# Patient Record
Sex: FEMALE | Race: WHITE
[De-identification: names, ages, dates, MRNs, and addresses within clinical notes are randomized per-mention and may not be internally consistent; named-entity substitution may affect disease eponyms.]

---

## 2016-05-27 VITALS
SYSTOLIC BLOOD PRESSURE: 132 MMHG | SYSTOLIC BLOOD PRESSURE: 132 MMHG | DIASTOLIC BLOOD PRESSURE: 60 MMHG | DIASTOLIC BLOOD PRESSURE: 60 MMHG

## 2017-07-05 ENCOUNTER — HOSPITAL ENCOUNTER (OUTPATIENT)
Dept: HOSPITAL 61 - US | Age: 77
Discharge: HOME | End: 2017-07-05
Attending: NURSE PRACTITIONER
Payer: MEDICARE

## 2017-07-05 DIAGNOSIS — R10.9: Primary | ICD-10-CM

## 2017-07-05 PROCEDURE — 76770 US EXAM ABDO BACK WALL COMP: CPT

## 2017-07-05 NOTE — RAD
Indication right flank pain.



Grayscale imaging targeted to the kidneys was performed. No similar imaging is

available.



The urinary bladder appeared grossly normal. There was no significant post

void residual. The mid and distal abdominal aorta appeared normal. The more

proximal abdominal aorta was obscured. The visualized inferior vena cava

appeared unremarkable. The right kidney measures 8.6 x 3.6 x 4.1 cm. Other

than being somewhat small the kidney appears unremarkable. No hydronephrosis

or mass is seen. The left kidney measures 10.6 x 6.3 x 4.4 cm. No

hydronephrosis or mass is seen.



IMPRESSION: Somewhat small right kidney. No mass or hydronephrosis is seen

associated with either kidney

## 2018-07-26 ENCOUNTER — HOSPITAL ENCOUNTER (OUTPATIENT)
Dept: HOSPITAL 61 - KCIC MRI | Age: 78
Discharge: HOME | End: 2018-07-26
Attending: FAMILY MEDICINE
Payer: MEDICARE

## 2018-07-26 DIAGNOSIS — R26.2: ICD-10-CM

## 2018-07-26 DIAGNOSIS — M47.896: Primary | ICD-10-CM

## 2018-07-26 DIAGNOSIS — M48.061: ICD-10-CM

## 2018-07-26 PROCEDURE — 72148 MRI LUMBAR SPINE W/O DYE: CPT

## 2018-08-09 ENCOUNTER — HOSPITAL ENCOUNTER (OUTPATIENT)
Dept: HOSPITAL 61 - PNCL | Age: 78
Discharge: HOME | End: 2018-08-09
Attending: ANESTHESIOLOGY
Payer: MEDICARE

## 2018-08-09 DIAGNOSIS — M79.605: Primary | ICD-10-CM

## 2018-08-09 DIAGNOSIS — M79.604: ICD-10-CM

## 2018-08-09 DIAGNOSIS — Z85.3: ICD-10-CM

## 2018-08-09 DIAGNOSIS — M54.5: ICD-10-CM

## 2018-08-09 DIAGNOSIS — M19.90: ICD-10-CM

## 2018-08-09 DIAGNOSIS — Z90.10: ICD-10-CM

## 2018-08-09 DIAGNOSIS — E11.9: ICD-10-CM

## 2018-08-09 DIAGNOSIS — I10: ICD-10-CM

## 2018-08-14 ENCOUNTER — HOSPITAL ENCOUNTER (OUTPATIENT)
Dept: HOSPITAL 61 - RAD | Age: 78
Discharge: HOME | End: 2018-08-14
Attending: NEUROLOGICAL SURGERY
Payer: MEDICARE

## 2018-08-14 DIAGNOSIS — M40.46: ICD-10-CM

## 2018-08-14 DIAGNOSIS — M43.16: ICD-10-CM

## 2018-08-14 DIAGNOSIS — I25.10: ICD-10-CM

## 2018-08-14 DIAGNOSIS — E11.9: ICD-10-CM

## 2018-08-14 DIAGNOSIS — M47.896: Primary | ICD-10-CM

## 2018-08-14 DIAGNOSIS — M25.78: ICD-10-CM

## 2018-08-14 DIAGNOSIS — I10: ICD-10-CM

## 2018-08-14 PROCEDURE — 72110 X-RAY EXAM L-2 SPINE 4/>VWS: CPT

## 2018-08-14 NOTE — RAD
EXAM: Supine AP and lateral views of the Lumbar spine with upright flexion

and extension views

 

DATE: 8/14/2018 11:38 AM

 

CLINICAL HISTORY: Spondylolisthesis, BACK PAIN

 

COMPARISON: MRI 7/26/2019

 

FINDINGS: 

There are 5 nonrib-bearing lumbar type vertebral bodies. Small ribs are 

seen at T12.

 

Mild L5-S1 intervertebral disc height loss.. Vertebral body heights are 

preserved.

 

Multilevel endplate osteophytes are seen. Moderate facet degenerative 

changes are seen at L3-4 and below. 

 

Mild straightening of the normal lumbar lordosis. There is approximately 

1.1 cm anterolisthesis of L4 on L5 on the flexion images and 8 mm 

anterolisthesis of L4 on L5 on the extension images. Normal bone density.

 

Atherosclerotic vascular calcifications are seen.

 

IMPRESSION:

1.  Anterolisthesis of L4 on L5, measuring 1.1 cm on the flexion images 

and 8 mm on the extension images. 

2.  Multilevel degenerative changes of the lumbar spine as above

 

Electronically signed by: Lee Garzon MD (8/14/2018 5:34 PM) [facility]

## 2019-01-04 ENCOUNTER — HOSPITAL ENCOUNTER (EMERGENCY)
Dept: HOSPITAL 61 - ER | Age: 79
LOS: 1 days | Discharge: HOME | End: 2019-01-05
Payer: MEDICARE

## 2019-01-04 VITALS — WEIGHT: 214 LBS | BODY MASS INDEX: 42.01 KG/M2 | HEIGHT: 60 IN

## 2019-01-04 DIAGNOSIS — M25.552: Primary | ICD-10-CM

## 2019-01-04 DIAGNOSIS — I10: ICD-10-CM

## 2019-01-04 DIAGNOSIS — Z95.5: ICD-10-CM

## 2019-01-04 DIAGNOSIS — Z90.89: ICD-10-CM

## 2019-01-04 DIAGNOSIS — E78.00: ICD-10-CM

## 2019-01-04 DIAGNOSIS — E11.9: ICD-10-CM

## 2019-01-04 DIAGNOSIS — Z90.710: ICD-10-CM

## 2019-01-04 DIAGNOSIS — Z88.8: ICD-10-CM

## 2019-01-04 PROCEDURE — 99283 EMERGENCY DEPT VISIT LOW MDM: CPT

## 2019-01-04 PROCEDURE — 73502 X-RAY EXAM HIP UNI 2-3 VIEWS: CPT

## 2019-01-05 VITALS
DIASTOLIC BLOOD PRESSURE: 65 MMHG | DIASTOLIC BLOOD PRESSURE: 65 MMHG | SYSTOLIC BLOOD PRESSURE: 152 MMHG | DIASTOLIC BLOOD PRESSURE: 65 MMHG | SYSTOLIC BLOOD PRESSURE: 152 MMHG | SYSTOLIC BLOOD PRESSURE: 152 MMHG

## 2019-01-05 NOTE — RAD
HIP LEFT 2V WITH PELVIS

 

History: LEFT HIP PAIN, SINCE 1/1/2019, NKI.

 

Comparison: None are available

 

Subchondral sclerosis at the pubic symphysis, greater on the right, with 

mild degenerative changes at the joint. Joint spaces at the right and left

hip appear intact. There is no evidence of an acute fracture or aggressive

bone destruction. Mild to moderate retained stool in the visualized colon.

 

IMPRESSION:

1. Changes at the pubic symphysis suggest osteitis pubis or degenerative 

change.

2. No acute radiographic findings.

 

Electronically signed by: Cesar Covarrubias MD (1/5/2019 8:35 AM) [facility]

## 2019-01-05 NOTE — PHYS DOC
Past Medical History


Past Medical History:  Arthritis, Cancer, Diabetes-Type II, High Cholesterol, 

Hypertension, Other


Additional Past Medical Histor:  SPINAL STENOSIS, LYMPHOMER, RESTLESS LEG 

SYNDROME. BREAST CA


Past Surgical History:  Angioplasty, Appendectomy, Hysterectomy, Tonsillectomy, 

Other


Additional Past Surgical Histo:  R MASECTOMY, LUMPECTOMY, FIBROIDECTOMY


Alcohol Use:  None


Drug Use:  None





Adult General


Chief Complaint


Chief Complaint:  HIP PAIN





HPI


HPI





Patient is a 78  year old female who presents with left hip pain. This is been 

present for the past 3-4 days. There is been no new trauma. Patient did receive 

cardiac catheter and stent on January 1 with a right femoral approach. Patient 

reports that the right side is doing fine. She is concerned that by increased 

weightbearing on the left due to protecting the right side, that this may have 

caused the issue. Patient is able to walk. Denies any numbness or tingling. 

Reports that she does get some relief with her hydrocodone. Patient was seen by 

her primary care physician as well as in urgent care tonight and they "did 

nothing." Weightbearing does seem to make the discomfort worse. Pain is 

moderate in intensity.[]





Review of Systems


Review of Systems





Constitutional: Denies fever or chills []


Eyes: Denies change in visual acuity, redness, or eye pain []


HENT: Denies nasal congestion or sore throat []


Respiratory: Denies cough or shortness of breath []


Cardiovascular: No chest pain or palpitations[]


GI: Denies abdominal pain, nausea, vomiting, bloody stools or diarrhea []


: Denies dysuria or hematuria []


Musculoskeletal: See history of present illness[]


Integument: Denies rash or skin lesions []


Neurologic: Denies headache, focal weakness or sensory changes []


Endocrine: Denies polyuria or polydipsia []





All other systems were reviewed and found to be within normal limits, except as 

documented in this note.





Allergies


Allergies





Allergies








Coded Allergies Type Severity Reaction Last Updated Verified


 


  ACE Inhibitors Allergy Intermediate  12/23/18 Yes


 


  amlodipine Allergy Intermediate  12/23/18 Yes


 


  rosuvastatin Allergy Intermediate  12/23/18 Yes











Physical Exam


Physical Exam





Constitutional: Well developed, well nourished, no acute distress, non-toxic 

appearance. []


HENT: Normocephalic, atraumatic, bilateral external ears normal, oropharynx 

moist, no oral exudates, nose normal. []


Eyes: PERRLA, EOMI, conjunctiva normal, no discharge. [] 


Neck: Normal range of motion, no tenderness, supple, no stridor. [] 


Cardiovascular:Heart rate regular rhythm, no murmur []


Lungs & Thorax:  Bilateral breath sounds clear to auscultation []


Abdomen: Bowel sounds normal, soft, no tenderness, no masses, no pulsatile 

masses. [] 


Skin: Warm, dry, no erythema, no rash. [] 


Back: No tenderness, no CVA tenderness. [] 


Extremities: Diffuse tenderness around the LEFT hip. Symmetric Active range of 

motion. No knee tenderness. Patient is distal neurovascularly intact. Pelvis is 

stable in 3 plains. no cyanosis, no clubbing, ROM intact, no edema. [] 


Neurologic: Alert and oriented X 3, normal motor function, normal sensory 

function, no focal deficits noted. []


Psychologic: Affect normal, judgement normal, mood normal. []





Current Patient Data


Vital Signs





 Vital Signs








  Date Time  Temp Pulse Resp B/P (MAP) Pulse Ox O2 Delivery O2 Flow Rate FiO2


 


1/5/19 00:09 99.4 103 24 142/74 (96) 93 Room Air  





 99.4       











EKG


EKG


[]





Radiology/Procedures


Radiology/Procedures


Left hip and pelvis x-ray shows no acute fracture or dislocation[]





Course & Med Decision Making


Course & Med Decision Making


Pertinent Labs and Imaging studies reviewed. (See chart for details)





ED course: Patient arrived, was placed in bed, tolerated exam well. Patient was 

transported to and from x-ray without any complications. After the return of 

the imaging findings, these were discussed with patient and family who voiced 

understanding. All questions were answered.





Medical decision making: There is no evidence of fracture dislocation. No 

evidence of a DVT. Given that patient is currently on a blood thinning 

medication cannot add NSAIDs to her regimen. We will provide a short course of 

short-term narcotic pain medicine for this acute issue.[]





Dragon Disclaimer


Dragon Disclaimer


This electronic medical record was generated, in whole or in part, using a 

voice recognition dictation system.





Departure


Departure


Impression:  


 Primary Impression:  


 Left hip pain


Disposition:  01 HOME, SELF-CARE


Condition:  GOOD


Referrals:  


BRADFORD SAN MD (PCP)


Follow-up in 2 days


Patient Instructions:  Hip Pain





Additional Instructions:  


Follow-up with your regular doctor in 2 days. Apply warm compresses for 15 

minutes at a time, at least 4 times a day. Return to the ER if worsening pain 

or any other concerns.


Scripts


Morphine Sulfate (MORPHINE SULFATE) 15 Mg Tablet


1 TAB PO BID, #20 TAB


   Prov: ERICA AVALOS DO         1/5/19











ERICA AVALOS DO Jan 5, 2019 00:13

## 2019-08-06 ENCOUNTER — HOSPITAL ENCOUNTER (OUTPATIENT)
Dept: HOSPITAL 61 - ECHO | Age: 79
Discharge: HOME | End: 2019-08-06
Attending: INTERNAL MEDICINE
Payer: MEDICARE

## 2019-08-06 DIAGNOSIS — I08.8: Primary | ICD-10-CM

## 2019-08-06 DIAGNOSIS — I25.10: ICD-10-CM

## 2019-08-06 PROCEDURE — 93306 TTE W/DOPPLER COMPLETE: CPT

## 2019-08-06 NOTE — CARD
MR#: [id number]

Account#: [id number]

Accession#: [id number]

Date of Study: 08/06/2019

Ordering Physician: SILVER BETANCUR, 

Referring Physician: SILVER BETANCUR, 

Tech: Agnes [address] ALLEY





--------------- APPROVED REPORT --------------





EXAM: Two-dimensional and M-mode echocardiogram with Doppler and color Doppler.



Other Information 

Quality : Good



INDICATION

Cardiac Disease: CAD 



2D DIMENSIONS 

RVDd2.5 (2.9-3.5cm)Left Atrium(2D)3.7 (1.6-4.0cm)

IVSd1.3 (0.7-1.1cm)Aortic Root(2D)2.3 (2.0-3.7cm)

LVDd3.4 (3.9-5.9cm)LVOT Diameter2.1 (1.8-2.4cm)

PWd1.0 (0.7-1.1cm)LVDs2.5 (2.5-4.0cm)

FS (%) 27.7 %SV26.4 ml

LVEF(%)54.8 (>50%)



Aortic Valve

AoV Peak Aureliano.136.8cm/sAoV VTI29.3cm

AO Peak GR.7.5mmHgLVOT Peak Aureliano.85.7cm/s

AO Mean GR.4mmHgAVA (VMAX)2.23cm2

DEAN   (VTI)2.70cm2



Mitral Valve

MV E Ovpuyufp42.5cm/sMV DECEL [id number]

MV A Kddgfqvk166.4cm/sE/A  Ratio0.6



Tricuspid Valve

TR P. Nruqvcya086gv/sRAP DLNPSVEM6sxXd

TR Peak Gr.45oiUbGQOT94zeJc



Pulmonary Vein

S1 Rogfreij53.0cm/sD2 Tpcssfbt14.4cm/s



 LEFT VENTRICLE 

The left ventricle is normal size. There is mild asymmetric septal hypertrophy. The left ventricular 
systolic function is normal and the ejection fraction is within normal range. The Ejection Fraction i
s 55-60%. There is normal LV segmental wall motion. Transmitral Doppler flow pattern is Grade I-abnor
mal relaxation pattern.



 RIGHT VENTRICLE 

The right ventricle is normal size. The right ventricular systolic function is normal.



 ATRIA 

The left atrium size is normal. The right atrium size is normal. The interatrial septum is intact wit
h no evidence for an atrial septal defect or patent foramen ovale as noted on 2-D or Doppler imaging.




 AORTIC VALVE 

The aortic valve is calcified but opens well. Doppler and Color Flow revealed no significant aortic r
egurgitation. There is no significant aortic valvular stenosis.



 MITRAL VALVE 

The mitral valve is calcified but opens well. Mitral annular calcification is mild. There is no evide
nce of mitral valve prolapse. There is no mitral valve stenosis. Doppler and Color Flow revealed no m
itral valve regurgitation noted.



 TRICUSPID VALVE 

The tricuspid valve is normal in structure and function. Doppler and Color Flow revealed physiologica
l tricuspid regurgitation. The PA pressure was estimated at 22 mmHg. There is no tricuspid valve sten
osis.



 PULMONIC VALVE 

The pulmonic valve is not well visualized. Doppler and Color Flow revealed mild pulmonic valvular reg
urgitation. There is no pulmonic valvular stenosis.



 GREAT VESSELS 

The aortic root is normal in size. The ascending aorta is normal in size. The IVC is normal in size a
nd collapses >50% with inspiration.



 PERICARDIAL EFFUSION 

There is no evidence of significant pericardial effusion.



Critical Notification

Critical Value: No



<Conclusion>

The left ventricular systolic function is normal and the ejection fraction is within normal range. Th
e Ejection Fraction is 55-60%.

There is normal LV segmental wall motion.



Signed by : Silver Betancur, 

Electronically Approved : 08/06/2019 10:22:36

## 2020-06-19 ENCOUNTER — HOSPITAL ENCOUNTER (OUTPATIENT)
Dept: HOSPITAL 61 - ECHO | Age: 80
Discharge: HOME | End: 2020-06-19
Attending: INTERNAL MEDICINE
Payer: MEDICARE

## 2020-06-19 DIAGNOSIS — I37.1: Primary | ICD-10-CM

## 2020-06-19 DIAGNOSIS — I25.10: ICD-10-CM

## 2020-06-19 PROCEDURE — 93306 TTE W/DOPPLER COMPLETE: CPT

## 2020-06-19 NOTE — CARD
MR#: [id number]

Account#: [id number]

Accession#: [id number]

Date of Study: 06/19/2020

Ordering Physician: SILVER REEVES, 

Referring Physician: SILVER REEVES, 

Tech: Kylee Mercado





--------------- APPROVED REPORT --------------





EXAM: Two-dimensional and M-mode echocardiogram with Doppler and color Doppler.



Other Information 

Quality : AverageHR: 75bpm

Technically limited study due to  body habitus.



INDICATION

Cardiac Disease: CAD 



RISK FACTORS

Hypertension 

Hyperlipidemia

Diabetes



2D DIMENSIONS 

RVDd2.8 (2.9-3.5cm)Left Atrium(2D)3.7 (1.6-4.0cm)

IVSd1.1 (0.7-1.1cm)LVDd4.0 (3.9-5.9cm)

LVOT Diameter2.0 (1.8-2.4cm)PWd0.9 (0.7-1.1cm)

LVDs2.7 (2.5-4.0cm)FS (%) 31.4 %

SV41.4 mlLVEF(%)59.9 (>50%)



Aortic Valve

AoV Peak Aureliano.127.1cm/sAoV VTI28.9cm

AO Peak GR.6.5mmHgLVOT Peak Aureliano.84.6cm/s

LVOT  VTI 20.53cmAO Mean GR.4mmHg

DEAN (VMAX)1.90kh7IRA   (VTI)2.28cm2



Mitral Valve

MV E Fcfjttxs19.5cm/sMV DECEL [id number]

MV A Ggamfkut80.4cm/sMV E Mean Gr.2mmHg

MV MBZ94zmU/A  Ratio0.7

MVA (PHT)3.39cm2



TDI

E/Lateral E'7.6E/Medial E'8.3



Pulmonary Valve

PV Peak Hufakzwi89.0cm/sPV Peak Grad.4mmHg



Tricuspid Valve

TR P. Nfwimwcj208zo/sRAP VCCNCJTC3bjYk

TR Peak Gr.52mlTjOMPX67zkVn



Pulmonary Vein

S1 Llppgulc85.8cm/sD2 Egullddo63.6cm/s

PVa aaurptts894ldoy



 LEFT VENTRICLE 

The left ventricle is normal size. There is normal left ventricular wall thickness. The left ventricu
lar systolic function is normal and the ejection fraction is within normal range. The Ejection Fracti
on is 55%. There is normal LV segmental wall motion. Transmitral Doppler flow pattern is Grade I-abno
rmal relaxation pattern.



 RIGHT VENTRICLE 

The right ventricle is normal size. There is normal right ventricular wall thickness. The right ventr
icular systolic function is normal.



 ATRIA 

The left atrium size is normal. The right atrium size is normal. The interatrial septum is intact wit
h no evidence for an atrial septal defect or patent foramen ovale as noted on 2-D or Doppler imaging.




 AORTIC VALVE 

The aortic valve is normal in structure and function. Cannot rule out prior bioprosthetic aortic valv
e. Doppler and Color Flow revealed no significant aortic regurgitation. There is no significant aorti
c valvular stenosis.



 MITRAL VALVE 

The mitral valve is normal in structure and function. There is no evidence of mitral valve prolapse. 
There is no mitral valve stenosis. Doppler and Color-flow revealed trace mitral regurgitation.



 TRICUSPID VALVE 

The tricuspid valve is normal in structure and function. Doppler and Color Flow revealed trace tricus
pid regurgitation with an estimated PAP of 39 mmHg. There is no tricuspid valve stenosis.



 PULMONIC VALVE 

The pulmonary valve is normal in structure and function. Doppler and Color Flow revealed trace to mil
d pulmonic valvular regurgitation. There is no pulmonic valvular stenosis.



 GREAT VESSELS 

The aortic root is normal in size. The ascending aorta is normal in size. The IVC is normal in size a
nd collapses >50% with inspiration.



 PERICARDIAL EFFUSION 

There is no evidence of significant pericardial effusion.



Critical Notification

Critical Value: No



<Conclusion>

The left ventricular systolic function is normal and the ejection fraction is within normal range. Th
e Ejection Fraction is 55%.

There is normal LV segmental wall motion.

The aortic valve is normal in structure and function. Cannot rule out prior bioprosthetic aortic valv
e.

Doppler and Color Flow revealed trace tricuspid regurgitation with an estimated PAP of 39 mmHg.



Signed by : Silver Reeves, 

Electronically Approved : 06/19/2020 11:15:25

## 2021-01-01 ENCOUNTER — HOSPITAL ENCOUNTER (INPATIENT)
Dept: HOSPITAL 61 - ER | Age: 81
LOS: 3 days | Discharge: HOME | DRG: 291 | End: 2021-01-04
Attending: INTERNAL MEDICINE | Admitting: INTERNAL MEDICINE
Payer: COMMERCIAL

## 2021-01-01 VITALS — WEIGHT: 207.45 LBS | HEIGHT: 60 IN | BODY MASS INDEX: 40.73 KG/M2

## 2021-01-01 DIAGNOSIS — E03.9: ICD-10-CM

## 2021-01-01 DIAGNOSIS — I50.33: ICD-10-CM

## 2021-01-01 DIAGNOSIS — Z90.710: ICD-10-CM

## 2021-01-01 DIAGNOSIS — Z20.822: ICD-10-CM

## 2021-01-01 DIAGNOSIS — Z90.49: ICD-10-CM

## 2021-01-01 DIAGNOSIS — M19.90: ICD-10-CM

## 2021-01-01 DIAGNOSIS — Z85.3: ICD-10-CM

## 2021-01-01 DIAGNOSIS — I13.0: Primary | ICD-10-CM

## 2021-01-01 DIAGNOSIS — R06.03: ICD-10-CM

## 2021-01-01 DIAGNOSIS — E87.2: ICD-10-CM

## 2021-01-01 DIAGNOSIS — E78.5: ICD-10-CM

## 2021-01-01 DIAGNOSIS — E11.22: ICD-10-CM

## 2021-01-01 DIAGNOSIS — I25.10: ICD-10-CM

## 2021-01-01 DIAGNOSIS — Z98.61: ICD-10-CM

## 2021-01-01 DIAGNOSIS — E78.00: ICD-10-CM

## 2021-01-01 DIAGNOSIS — Z82.49: ICD-10-CM

## 2021-01-01 DIAGNOSIS — Z90.11: ICD-10-CM

## 2021-01-01 DIAGNOSIS — F32.9: ICD-10-CM

## 2021-01-01 DIAGNOSIS — Z88.8: ICD-10-CM

## 2021-01-01 DIAGNOSIS — E66.9: ICD-10-CM

## 2021-01-01 DIAGNOSIS — N18.9: ICD-10-CM

## 2021-01-01 DIAGNOSIS — G25.81: ICD-10-CM

## 2021-01-01 PROCEDURE — 83735 ASSAY OF MAGNESIUM: CPT

## 2021-01-01 PROCEDURE — 80048 BASIC METABOLIC PNL TOTAL CA: CPT

## 2021-01-01 PROCEDURE — 84443 ASSAY THYROID STIM HORMONE: CPT

## 2021-01-01 PROCEDURE — 99285 EMERGENCY DEPT VISIT HI MDM: CPT

## 2021-01-01 PROCEDURE — 82553 CREATINE MB FRACTION: CPT

## 2021-01-01 PROCEDURE — 82962 GLUCOSE BLOOD TEST: CPT

## 2021-01-01 PROCEDURE — 96374 THER/PROPH/DIAG INJ IV PUSH: CPT

## 2021-01-01 PROCEDURE — 84484 ASSAY OF TROPONIN QUANT: CPT

## 2021-01-01 PROCEDURE — 83605 ASSAY OF LACTIC ACID: CPT

## 2021-01-01 PROCEDURE — G0378 HOSPITAL OBSERVATION PER HR: HCPCS

## 2021-01-01 PROCEDURE — 93306 TTE W/DOPPLER COMPLETE: CPT

## 2021-01-01 PROCEDURE — 83880 ASSAY OF NATRIURETIC PEPTIDE: CPT

## 2021-01-01 PROCEDURE — 36415 COLL VENOUS BLD VENIPUNCTURE: CPT

## 2021-01-01 PROCEDURE — 85379 FIBRIN DEGRADATION QUANT: CPT

## 2021-01-01 PROCEDURE — 71275 CT ANGIOGRAPHY CHEST: CPT

## 2021-01-01 PROCEDURE — 84439 ASSAY OF FREE THYROXINE: CPT

## 2021-01-01 PROCEDURE — 87086 URINE CULTURE/COLONY COUNT: CPT

## 2021-01-01 PROCEDURE — 80053 COMPREHEN METABOLIC PANEL: CPT

## 2021-01-01 PROCEDURE — 96361 HYDRATE IV INFUSION ADD-ON: CPT

## 2021-01-01 PROCEDURE — 84481 FREE ASSAY (FT-3): CPT

## 2021-01-01 PROCEDURE — 84100 ASSAY OF PHOSPHORUS: CPT

## 2021-01-01 PROCEDURE — 85025 COMPLETE CBC W/AUTO DIFF WBC: CPT

## 2021-01-01 PROCEDURE — 81001 URINALYSIS AUTO W/SCOPE: CPT

## 2021-01-01 PROCEDURE — U0003 INFECTIOUS AGENT DETECTION BY NUCLEIC ACID (DNA OR RNA); SEVERE ACUTE RESPIRATORY SYNDROME CORONAVIRUS 2 (SARS-COV-2) (CORONAVIRUS DISEASE [COVID-19]), AMPLIFIED PROBE TECHNIQUE, MAKING USE OF HIGH THROUGHPUT TECHNOLOGIES AS DESCRIBED BY CMS-2020-01-R: HCPCS

## 2021-01-01 PROCEDURE — 93005 ELECTROCARDIOGRAM TRACING: CPT

## 2021-01-02 VITALS — SYSTOLIC BLOOD PRESSURE: 166 MMHG | DIASTOLIC BLOOD PRESSURE: 77 MMHG

## 2021-01-02 VITALS — SYSTOLIC BLOOD PRESSURE: 116 MMHG | DIASTOLIC BLOOD PRESSURE: 65 MMHG

## 2021-01-02 VITALS — SYSTOLIC BLOOD PRESSURE: 136 MMHG | DIASTOLIC BLOOD PRESSURE: 90 MMHG

## 2021-01-02 VITALS — SYSTOLIC BLOOD PRESSURE: 147 MMHG | DIASTOLIC BLOOD PRESSURE: 93 MMHG

## 2021-01-02 VITALS — SYSTOLIC BLOOD PRESSURE: 176 MMHG | DIASTOLIC BLOOD PRESSURE: 84 MMHG

## 2021-01-02 VITALS — SYSTOLIC BLOOD PRESSURE: 169 MMHG | DIASTOLIC BLOOD PRESSURE: 93 MMHG

## 2021-01-02 VITALS — SYSTOLIC BLOOD PRESSURE: 167 MMHG | DIASTOLIC BLOOD PRESSURE: 76 MMHG

## 2021-01-02 LAB
ALBUMIN SERPL-MCNC: 3.7 G/DL (ref 3.4–5)
ALBUMIN/GLOB SERPL: 1 {RATIO} (ref 1–1.7)
ALP SERPL-CCNC: 94 U/L (ref 46–116)
ALT SERPL-CCNC: 30 U/L (ref 14–59)
ANION GAP SERPL CALC-SCNC: 12 MMOL/L (ref 6–14)
APTT PPP: YELLOW S
AST SERPL-CCNC: 23 U/L (ref 15–37)
BACTERIA #/AREA URNS HPF: (no result) /HPF
BASOPHILS # BLD AUTO: 0.1 X10^3/UL (ref 0–0.2)
BASOPHILS NFR BLD: 1 % (ref 0–3)
BILIRUB SERPL-MCNC: 0.4 MG/DL (ref 0.2–1)
BILIRUB UR QL STRIP: NEGATIVE
BUN SERPL-MCNC: 18 MG/DL (ref 7–20)
BUN/CREAT SERPL: 15 (ref 6–20)
CALCIUM SERPL-MCNC: 9.5 MG/DL (ref 8.5–10.1)
CHLORIDE SERPL-SCNC: 102 MMOL/L (ref 98–107)
CK SERPL-CCNC: 90 U/L (ref 26–192)
CO2 SERPL-SCNC: 26 MMOL/L (ref 21–32)
CREAT SERPL-MCNC: 1.2 MG/DL (ref 0.6–1)
EOSINOPHIL NFR BLD: 0.3 X10^3/UL (ref 0–0.7)
EOSINOPHIL NFR BLD: 3 % (ref 0–3)
ERYTHROCYTE [DISTWIDTH] IN BLOOD BY AUTOMATED COUNT: 13.3 % (ref 11.5–14.5)
FIBRINOGEN PPP-MCNC: CLEAR MG/DL
GFR SERPLBLD BASED ON 1.73 SQ M-ARVRAT: 43.3 ML/MIN
GLUCOSE SERPL-MCNC: 152 MG/DL (ref 70–99)
HCT VFR BLD CALC: 40.2 % (ref 36–47)
HGB BLD-MCNC: 13.5 G/DL (ref 12–15.5)
LYMPHOCYTES # BLD: 2.1 X10^3/UL (ref 1–4.8)
LYMPHOCYTES NFR BLD AUTO: 26 % (ref 24–48)
MAGNESIUM SERPL-MCNC: 1.9 MG/DL (ref 1.8–2.4)
MCH RBC QN AUTO: 33 PG (ref 25–35)
MCHC RBC AUTO-ENTMCNC: 34 G/DL (ref 31–37)
MCV RBC AUTO: 97 FL (ref 79–100)
MONO #: 0.7 X10^3/UL (ref 0–1.1)
MONOCYTES NFR BLD: 9 % (ref 0–9)
NEUT #: 5 X10^3/UL (ref 1.8–7.7)
NEUTROPHILS NFR BLD AUTO: 61 % (ref 31–73)
NITRITE UR QL STRIP: NEGATIVE
PH UR STRIP: 6.5 [PH]
PLATELET # BLD AUTO: 245 X10^3/UL (ref 140–400)
POTASSIUM SERPL-SCNC: 4.2 MMOL/L (ref 3.5–5.1)
PROT SERPL-MCNC: 7.4 G/DL (ref 6.4–8.2)
PROT UR STRIP-MCNC: NEGATIVE MG/DL
RBC # BLD AUTO: 4.16 X10^6/UL (ref 3.5–5.4)
RBC #/AREA URNS HPF: (no result) /HPF (ref 0–2)
SODIUM SERPL-SCNC: 140 MMOL/L (ref 136–145)
T4 FREE SERPL-MCNC: 0.86 NG/DL (ref 0.76–1.46)
THYROID STIM HORMONE (TSH): 5.75 UIU/ML (ref 0.36–3.74)
UROBILINOGEN UR-MCNC: 0.2 MG/DL
WBC # BLD AUTO: 8.2 X10^3/UL (ref 4–11)
WBC #/AREA URNS HPF: (no result) /HPF (ref 0–4)

## 2021-01-02 RX ADMIN — METOPROLOL SUCCINATE SCH MG: 25 TABLET, EXTENDED RELEASE ORAL at 13:11

## 2021-01-02 RX ADMIN — CALCIUM CARBONATE-VITAMIN D TAB 500 MG-200 UNIT SCH TAB: 500-200 TAB at 17:17

## 2021-01-02 RX ADMIN — PRAMIPEXOLE DIHYDROCHLORIDE SCH MG: 0.25 TABLET ORAL at 21:49

## 2021-01-02 RX ADMIN — EZETIMIBE SCH MG: 10 TABLET ORAL at 13:10

## 2021-01-02 RX ADMIN — LEVOTHYROXINE SODIUM SCH MCG: 88 TABLET ORAL at 13:12

## 2021-01-02 RX ADMIN — INSULIN LISPRO SCH UNITS: 100 INJECTION, SOLUTION INTRAVENOUS; SUBCUTANEOUS at 12:00

## 2021-01-02 RX ADMIN — VENLAFAXINE SCH MG: 50 TABLET ORAL at 13:13

## 2021-01-02 RX ADMIN — TRAZODONE HYDROCHLORIDE SCH MG: 50 TABLET ORAL at 21:42

## 2021-01-02 RX ADMIN — INSULIN LISPRO SCH UNITS: 100 INJECTION, SOLUTION INTRAVENOUS; SUBCUTANEOUS at 17:19

## 2021-01-02 RX ADMIN — Medication SCH TAB: at 13:10

## 2021-01-02 RX ADMIN — ASPIRIN SCH MG: 81 TABLET, COATED ORAL at 13:10

## 2021-01-02 RX ADMIN — BUPROPION HYDROCHLORIDE SCH MG: 150 TABLET, FILM COATED, EXTENDED RELEASE ORAL at 13:38

## 2021-01-02 RX ADMIN — INSULIN LISPRO SCH UNITS: 100 INJECTION, SOLUTION INTRAVENOUS; SUBCUTANEOUS at 09:27

## 2021-01-02 RX ADMIN — VENLAFAXINE SCH MG: 50 TABLET ORAL at 21:42

## 2021-01-02 NOTE — RAD
PQRS Compliance Statement:



One or more of the following individualized dose reduction techniques were utilized for this examinat
ion:  

1. Automated exposure control  

2. Adjustment of the mA and/or kV according to patient size  

3. Use of iterative reconstruction technique



CTA CHEST 1/2/2021 2:35 AM



Indication: Shortness of air, elevated d-dimer



COMPARISON: None available.



TECHNIQUE: Multiple axial CT images of the chest were obtained after intravenous administration of 10
0 cc Omnipaque 350. Coronal and sagittal reformats are provided. Maximum intensity projection images 
are provided.



FINDINGS:



Right thyroid gland is normal. The thyroid gland is either diminutive or absent. Precarinal lymph nod
e measures 9 mm. No pathologically enlarged thoracic lymph nodes are identified. Heart size is border
line enlarged. Mild three-vessel coronary artery vascular calcifications are present. Evaluation of t
he pulmonary arterial system is limited by respiratory motion. This symptoms evaluation of the right 
upper lobe pulmonary arteries. The central pulmonary artery, main pulmonary arteries and bilateral lo
wer lobe pulmonary arteries are widely patent. Lingula and left upper lobe pulmonary arteries are pat
ent without filling defects to suggest acute or chronic pulmonary emboli. Respiratory motion limits e
valuation of the lungs. There is mild pulmonary vascular congestion. No definite solid noncalcified p
ulmonary nodule. No pleural effusions or pneumothorax. Mild gallbladder wall calcification is identif
ied. Simple cyst identified in the super pole left kidney measuring 9 mm. No suspicious osseous abnor
mality.



IMPRESSION:

1. No evidence for acute or chronic pulmonary embolism. Evaluation of the right upper lobe pulmonary 
arteries limited by motion artifact. 



2. Evaluation of the lungs limited by motion. There is mild pulmonary vascular congestion and mild ca
rdiomegaly as may be seen with congestive heart failure. No focal airspace consolidation.



Electronically signed by: Vianeny Grant MD (1/2/2021 3:10 AM) [facility]

## 2021-01-02 NOTE — PDOC1
History and Physical


Date of Service:


DOS:


DATE: 1/2/21 


TIME: 07:50





Chief Complaint:


Chief Complain:


Shortness of breath and palpitations





History of Present Illness:


HPI:


80-year-old female presents with report of shortness of breath that started at 

1400 today while organizing things in her house.  Patient denies any fever or 

chills.  Denies cough.  Denies nasal congestion.  Denies known sick contacts.  

Denies known exposure to COVID-19.  Patient does report some palpitations.  

Patient does report history of partial thyroidectomy.  Reports has been 

compliant with taking her thyroid medication.





Past Medical/Surgical History:


PMH/PSH:


Past Medical History:  Arthritis, Cancer, Diabetes-Type II, High Cholesterol, 

Hypertension, Hypothyroid, SPINAL STENOSIS, LYMPHOMER, RESTLESS LEG SYNDROME. 

BREAST CA


Past Surgical History:  Angioplasty, Appendectomy, Hysterectomy, Tonsillectomy, 

R MASECTOMY, LUMPECTOMY, FIBROIDECTOMY, Partial thyroidectomy





Allergies:


Allergies:  


Coded Allergies:  


     ACE Inhibitors (Verified  Allergy, Intermediate, 12/23/18)


     amlodipine (Verified  Allergy, Intermediate, 12/23/18)


     rosuvastatin (Verified  Allergy, Intermediate, 12/23/18)


   muscle cramps





Family History:


Family History:


Reviewed with no relevant findings





Social History:


Social History:


Smoking Status:  Never Smoker


Alcohol Use:  None


Drug Use:  None





Current Medications:


Current Medications





Current Medications


Sodium Chloride 1,000 ml @  1,000 mls/hr 1X  ONCE IV  Last administered on 

1/2/21at 00:40;  Start 1/1/21 at 23:45;  Stop 1/2/21 at 00:44;  Status DC


Aspirin (Ecotrin) 325 mg 1X  ONCE PO  Last administered on 1/2/21at 00:40;  

Start 1/1/21 at 23:45;  Stop 1/1/21 at 23:46;  Status DC


Sodium Chloride 1,000 ml @  125 mls/hr 1X  ONCE IV  Last administered on 

1/2/21at 03:30;  Start 1/2/21 at 02:30;  Stop 1/2/21 at 10:29


Iohexol (Omnipaque 350 Mg/ml) 100 ml 1X  ONCE IV  Last administered on 1/2/21at 

03:05;  Start 1/2/21 at 03:00;  Stop 1/2/21 at 03:01;  Status DC


Info (CONTRAST GIVEN -- Rx MONITORING) 1 each PRN DAILY  PRN MC SEE COMMENTS;  

Start 1/2/21 at 02:45;  Stop 1/4/21 at 02:44


Lorazepam (Ativan Inj) 1 mg 1X  ONCE IVP  Last administered on 1/2/21at 05:47;  

Start 1/2/21 at 04:30;  Stop 1/2/21 at 04:31;  Status DC


Insulin Human Lispro (HumaLOG) 0-5 UNITS TIDWMEALS SQ ;  Start 1/2/21 at 08:00


Dextrose (Dextrose 50%-Water Syringe) 12.5 gm PRN Q15MIN  PRN IV SEE COMMENTS;  

Start 1/2/21 at 04:00


Sodium Chloride 1,000 ml @  100 mls/hr 1X  ONCE IV ;  Start 1/2/21 at 04:00;  

Stop 1/2/21 at 04:10;  Status DC





Active Scripts


Active


Morphine Sulfate 15 Mg Tablet 1 Tab PO BID


Reported


Cozaar ** (Losartan Potassium) 50 Mg Tablet 50 Mg PO DAILY


Cozaar ** (Losartan Potassium) 50 Mg Tablet 50 Mg PO DAILY


Toprol Xl (Metoprolol Succinate) 50 Mg Tab.er.24h 50 Mg PO DAILY


Super B Complex (Vitamin B Complex & Vit C No.4) 150 Mg Tablet 150 Mg PO DAILY


Calcium + D Soft Chewable Tab (Ca Carbonate/Vitamin D3/Vit K) 1 Each Tab.chew 2 

Each PO DAILY


Januvia (Sitagliptin Phosphate) 100 Mg Tablet 1 Tab PO DAILY


Bupropion Xl (Bupropion Hcl) 300 Mg Tab.er.24h 1 Tab PO DAILY


Aspir-Low (Aspirin) 81 Mg Tablet. 1 Tab PO DAILY


Mirapex (Pramipexole Di-Hcl) 0.25 Mg Tablet 1 Tab PO QHS


Hydrocodone-Apap 7.5-325  ** (Hydrocodone Bit/Acetaminophen) 1 Each Tablet 1 Tab

PO PRN Q6HRS PRN


Trazodone Hcl 50 Mg Tablet 50 Mg PO DAILY


Glipizide 5 Mg Tablet 5 Mg PO DAILY


Atorvastatin Calcium 10 Mg Tablet 1 Tab PO DAILY


Cymbalta (Duloxetine Hcl) 60 Mg Capsule. 1 Cap PO BID


Metformin Hcl 1,000 Mg Tablet 1,000 Mg PO [id number]


Levothyroxine Sodium 88 Mcg Tablet 1 Tab PO DAILY





ROS:


Review of Systems


Review of System


REVIEW OF SYSTEMS:


GENERAL:  Denies weakness


SKIN:  No bruising, hair changes or rashes.


EYES:  No blurred, double or loss of vision.


NOSE AND THROAT:  No history of nosebleeds, hoarseness or sore throat.


HEART:  No history of palpitations, chest pain or shortness of breath on


exertion.


LUNGS:  Denies cough, hemoptysis, wheezing or shortness of breath.


GASTROINTESTINAL:  Denies changes in appetite, nausea, vomiting, diarrhea or


constipation.


GENITOURINARY:  No history of frequency, urgency, hesitancy or nocturia.


NEUROLOGIC:  Denies history of numbness, tingling, or tremor.


PSYCHIATRIC:  No history of panic, anxiety or depression.


ENDOCRINE:  No history of heat or cold intolerance, polyuria or polydipsia.


EXTREMITIES:  Denies joint pain, pain on walking or stiffness.





Physical Exam:


Vital Signs:





Vital Signs








  Date Time  Temp Pulse Resp B/P (MAP) Pulse Ox O2 Delivery O2 Flow Rate FiO2


 


1/2/21 06:27 97.6 119 22 136/90 (105) 99 Room Air  





 97.6       








Physcial Exam:


GEN:   No apparent distress.  Alert and oriented


HEENT:   Normal cephalic, atraumatic, external auditory canals are patent


EYES:   Extraocular muscles are intact, pupil are equally round and reactive to 

light and accommodation


MUSCULOSKELETAL:  Well developed , well nourished, good range of motion


ENDOCRINE:   No thyromegaly was palpated


LYMPHATICS:   No cervical chain or axillary nodes were noted


HEMATOPOIETIC:  No bruising


NECK:   Supple, no JVD, no thyromegaly was noted


LUNGS:   Clear to auscultation in all lung fields without rhonchi or wheezing


HEART:    RRR, S!, S2 present.  Peripheral pulses intact, no obvious murmurs 

noted


ABDOMEN:   Soft, nontender.  Positive bowel sounds, no organomegaly, normal 

bowel sounds


EXTREMITIES:   Without clubbing, cyanosis, or edema.  Pedal pulses intact.  

Negative Homans sign


NEUROLOGIC:   Normal speech and tone.  A&O x 3, moves all extremities, no 

obvious focal deficits


PSYCHIATRIC:   Normal affect, normal mood. Stable


SKIN:   No ulcerations or rashes, good skin turgor, no jaundice


VASCULAR:   Good capillary refill, neurovascular bundle appears to be intact





Labs:


Labs:





Laboratory Tests








Test


 1/2/21


00:35 1/2/21


02:08 1/2/21


05:25


 


White Blood Count


 8.2 x10^3/uL


(4.0-11.0) 


 





 


Red Blood Count


 4.16 x10^6/uL


(3.50-5.40) 


 





 


Hemoglobin


 13.5 g/dL


(12.0-15.5) 


 





 


Hematocrit


 40.2 %


(36.0-47.0) 


 





 


Mean Corpuscular Volume 97 fL ()   


 


Mean Corpuscular Hemoglobin 33 pg (25-35)   


 


Mean Corpuscular Hemoglobin


Concent 34 g/dL


(31-37) 


 





 


Red Cell Distribution Width


 13.3 %


(11.5-14.5) 


 





 


Platelet Count


 245 x10^3/uL


(140-400) 


 





 


Neutrophils (%) (Auto) 61 % (31-73)   


 


Lymphocytes (%) (Auto) 26 % (24-48)   


 


Monocytes (%) (Auto) 9 % (0-9)   


 


Eosinophils (%) (Auto) 3 % (0-3)   


 


Basophils (%) (Auto) 1 % (0-3)   


 


Neutrophils # (Auto)


 5.0 x10^3/uL


(1.8-7.7) 


 





 


Lymphocytes # (Auto)


 2.1 x10^3/uL


(1.0-4.8) 


 





 


Monocytes # (Auto)


 0.7 x10^3/uL


(0.0-1.1) 


 





 


Eosinophils # (Auto)


 0.3 x10^3/uL


(0.0-0.7) 


 





 


Basophils # (Auto)


 0.1 x10^3/uL


(0.0-0.2) 


 





 


D-Dimer (Flori)


 1.39 ug/mlFEU


(0.00-0.50) 


 





 


Sodium Level


 140 mmol/L


(136-145) 


 





 


Potassium Level


 4.2 mmol/L


(3.5-5.1) 


 





 


Chloride Level


 102 mmol/L


() 


 





 


Carbon Dioxide Level


 26 mmol/L


(21-32) 


 





 


Anion Gap 12 (6-14)   


 


Blood Urea Nitrogen


 18 mg/dL


(7-20) 


 





 


Creatinine


 1.2 mg/dL


(0.6-1.0) 


 





 


Estimated GFR


(Cockcroft-Gault) 43.3 


 


 





 


BUN/Creatinine Ratio 15 (6-20)   


 


Glucose Level


 152 mg/dL


(70-99) 


 





 


Lactic Acid Level


 3.5 mmol/L


(0.4-2.0) 


 2.1 mmol/L


(0.4-2.0)


 


Calcium Level


 9.5 mg/dL


(8.5-10.1) 


 





 


Magnesium Level


 1.9 mg/dL


(1.8-2.4) 


 





 


Total Bilirubin


 0.4 mg/dL


(0.2-1.0) 


 





 


Aspartate Amino Transf


(AST/SGOT) 23 U/L (15-37) 


 


 





 


Alanine Aminotransferase


(ALT/SGPT) 30 U/L (14-59) 


 


 





 


Alkaline Phosphatase


 94 U/L


() 


 





 


Creatine Kinase


 90 U/L


() 


 





 


Creatine Kinase MB (Mass)


 0.8 ng/mL


(0.0-3.6) 


 





 


Creatine Kinase MB Relative


Index 0.9 % (0-4) 


 


 





 


Troponin I Quantitative


 < 0.017 ng/mL


(0.000-0.055) 


 < 0.017 ng/mL


(0.000-0.055)


 


NT-Pro-B-Type Natriuretic


Peptide 128 pg/mL


(0-449) 


 





 


Total Protein


 7.4 g/dL


(6.4-8.2) 


 





 


Albumin


 3.7 g/dL


(3.4-5.0) 


 





 


Albumin/Globulin Ratio 1.0 (1.0-1.7)   


 


Thyroid Stimulating Hormone


(TSH) 5.754 uIU/mL


(0.358-3.74) 


 





 


Free Thyroxine


 0.86 ng/dL


(0.76-1.46) 


 





 


Free Triiodothyronine (T3)


pg/mL 2.28 pg/mL


(2.18-3.98) 


 





 


Urine Collection Type  Unknown  


 


Urine Color  Yellow  


 


Urine Clarity  Clear  


 


Urine pH  6.5 (<5.0-8.0)  


 


Urine Specific Gravity


 


 <=1.005


(1.000-1.030) 





 


Urine Protein


 


 Negative mg/dL


(NEG-TRACE) 





 


Urine Glucose (UA)


 


 Negative mg/dL


(NEG) 





 


Urine Ketones (Stick)


 


 Negative mg/dL


(NEG) 





 


Urine Blood  Negative (NEG)  


 


Urine Nitrite  Negative (NEG)  


 


Urine Bilirubin  Negative (NEG)  


 


Urine Urobilinogen Dipstick


 


 0.2 mg/dL (0.2


mg/dL) 





 


Urine Leukocyte Esterase  Small (NEG)  


 


Urine RBC  1-2 /HPF (0-2)  


 


Urine WBC  1-4 /HPF (0-4)  


 


Urine Squamous Epithelial


Cells 


 Few /LPF 


 





 


Urine Bacteria


 


 Few /HPF


(0-FEW) 





 


Urine Mucus  Slight /LPF  








Laboratory Tests








Test


 1/2/21 00:35 1/2/21


02:08 1/2/21


05:25


 


White Blood Count


 8.2 x10^3/uL


(4.0-11.0) 


 





 


Red Blood Count


 4.16 x10^6/uL


(3.50-5.40) 


 





 


Hemoglobin


 13.5 g/dL


(12.0-15.5) 


 





 


Hematocrit


 40.2 %


(36.0-47.0) 


 





 


Mean Corpuscular Volume 97 fL ()   


 


Mean Corpuscular Hemoglobin 33 pg (25-35)   


 


Mean Corpuscular Hemoglobin


Concent 34 g/dL


(31-37) 


 





 


Red Cell Distribution Width


 13.3 %


(11.5-14.5) 


 





 


Platelet Count


 245 x10^3/uL


(140-400) 


 





 


Neutrophils (%) (Auto) 61 % (31-73)   


 


Lymphocytes (%) (Auto) 26 % (24-48)   


 


Monocytes (%) (Auto) 9 % (0-9)   


 


Eosinophils (%) (Auto) 3 % (0-3)   


 


Basophils (%) (Auto) 1 % (0-3)   


 


Neutrophils # (Auto)


 5.0 x10^3/uL


(1.8-7.7) 


 





 


Lymphocytes # (Auto)


 2.1 x10^3/uL


(1.0-4.8) 


 





 


Monocytes # (Auto)


 0.7 x10^3/uL


(0.0-1.1) 


 





 


Eosinophils # (Auto)


 0.3 x10^3/uL


(0.0-0.7) 


 





 


Basophils # (Auto)


 0.1 x10^3/uL


(0.0-0.2) 


 





 


D-Dimer (Flori)


 1.39 ug/mlFEU


(0.00-0.50) 


 





 


Sodium Level


 140 mmol/L


(136-145) 


 





 


Potassium Level


 4.2 mmol/L


(3.5-5.1) 


 





 


Chloride Level


 102 mmol/L


() 


 





 


Carbon Dioxide Level


 26 mmol/L


(21-32) 


 





 


Anion Gap 12 (6-14)   


 


Blood Urea Nitrogen


 18 mg/dL


(7-20) 


 





 


Creatinine


 1.2 mg/dL


(0.6-1.0) 


 





 


Estimated GFR


(Cockcroft-Gault) 43.3 


 


 





 


BUN/Creatinine Ratio 15 (6-20)   


 


Glucose Level


 152 mg/dL


(70-99) 


 





 


Lactic Acid Level


 3.5 mmol/L


(0.4-2.0) 


 2.1 mmol/L


(0.4-2.0)


 


Calcium Level


 9.5 mg/dL


(8.5-10.1) 


 





 


Magnesium Level


 1.9 mg/dL


(1.8-2.4) 


 





 


Total Bilirubin


 0.4 mg/dL


(0.2-1.0) 


 





 


Aspartate Amino Transf


(AST/SGOT) 23 U/L (15-37) 


 


 





 


Alanine Aminotransferase


(ALT/SGPT) 30 U/L (14-59) 


 


 





 


Alkaline Phosphatase


 94 U/L


() 


 





 


Creatine Kinase


 90 U/L


() 


 





 


Creatine Kinase MB (Mass)


 0.8 ng/mL


(0.0-3.6) 


 





 


Creatine Kinase MB Relative


Index 0.9 % (0-4) 


 


 





 


Troponin I Quantitative


 < 0.017 ng/mL


(0.000-0.055) 


 < 0.017 ng/mL


(0.000-0.055)


 


NT-Pro-B-Type Natriuretic


Peptide 128 pg/mL


(0-449) 


 





 


Total Protein


 7.4 g/dL


(6.4-8.2) 


 





 


Albumin


 3.7 g/dL


(3.4-5.0) 


 





 


Albumin/Globulin Ratio 1.0 (1.0-1.7)   


 


Thyroid Stimulating Hormone


(TSH) 5.754 uIU/mL


(0.358-3.74) 


 





 


Free Thyroxine


 0.86 ng/dL


(0.76-1.46) 


 





 


Free Triiodothyronine (T3)


pg/mL 2.28 pg/mL


(2.18-3.98) 


 





 


Urine Collection Type  Unknown  


 


Urine Color  Yellow  


 


Urine Clarity  Clear  


 


Urine pH  6.5 (<5.0-8.0)  


 


Urine Specific Gravity


 


 <=1.005


(1.000-1.030) 





 


Urine Protein


 


 Negative mg/dL


(NEG-TRACE) 





 


Urine Glucose (UA)


 


 Negative mg/dL


(NEG) 





 


Urine Ketones (Stick)


 


 Negative mg/dL


(NEG) 





 


Urine Blood  Negative (NEG)  


 


Urine Nitrite  Negative (NEG)  


 


Urine Bilirubin  Negative (NEG)  


 


Urine Urobilinogen Dipstick


 


 0.2 mg/dL (0.2


mg/dL) 





 


Urine Leukocyte Esterase  Small (NEG)  


 


Urine RBC  1-2 /HPF (0-2)  


 


Urine WBC  1-4 /HPF (0-4)  


 


Urine Squamous Epithelial


Cells 


 Few /LPF 


 





 


Urine Bacteria


 


 Few /HPF


(0-FEW) 





 


Urine Mucus  Slight /LPF  











Images:


Images


CHEST CTA





IMPRESSION:


1. No evidence for acute or chronic pulmonary embolism. Evaluation of the right 

upper lobe pulmonary arteries limited by motion artifact. 





2. Evaluation of the lungs limited by motion. There is mild pulmonary vascular 

congestion and mild cardiomegaly as may be seen with congestive heart failure. 

No focal airspace consolidation.





Assessment/Plan


Assessment/Plan


Acute respiratory distress and palpitations concerning for non-STEMI


Investigation for COVID-19


Subclinical hypothyroidism


Elevated D-dimer


Lactic acidosis





Admit to medicine for further management


Cardiology consult for palpitations and tachycardia


Continue IV thiamine and vitamin C


IV 4 mg dexamethasone Daily if Covid returns positive


Titrate O2 supplementation to maintain O2 saturation greater than 92%


Continue telemetry monitoring


Lovenox for DVT prophylaxis


Protonix GI prophylaxis


ADA diet


Full code


Discussed with RN and SW


Disposition pending cardiac evaluation


Surrogate decision maker is the  or son





Justifications for Admission


Other Justification














CAMILLA MERCADO MD                     Jan 2, 2021 07:53

## 2021-01-02 NOTE — PDOC2
CONSULT


Date of Consult


Date of Consult


DATE: 21 


TIME: 17:03





Reason for Consult


Reason for Consult:


Tachycardia





Referring Physician


Referring Physician:


Dr. Leung





Identification/Chief Complaint


Chief Complaint


Shortness of breath





Source


Source:  Chart review, Patient





History of Present Illness


Reason for Visit:


The patient is a pleasant 80-year-old female who was admitted through the 

emergency room with episodes of increasing shortness of breath.  Patient denies 

any chest pain.  Her initial EKG showed a sinus tachycardia rate of 

approximately 120 with no acute ischemic changes.  She had wondered went a CTA 

of the chest secondary to elevated D-dimer in the test showed no pulmonary 

emboli but did show mild pulmonary vascular congestion.  She has a history of 

hypertension as well as hyperlipidemia and coronary artery disease.  Her 

troponin has been negative x2.  She states she is feeling better today after 

treatment.  She is being evaluated for possible Covid infection.





Past Medical History


Cardiovascular:  CAD, CHF, HTN, Hyperlipidemia


Pulmonary:  No pertinent hx


GI:  No pertinent hx


Heme/Onc:  No pertinent hx, Other


Hepatobiliary:  No pertinent hx


Renal/:  No pertinent hx, Chronic renal insuff


Endocrine:  Diabetes, Hypothyroidism





Past Surgical History


Past Surgical History:  Appendectomy, Breast Biopsy, , Other (Possible 

coronary angioplasty.  Right mastectomy.  Partial thyroidectomy)





Family History


Family History:  Hypertension, Other





Social History


No


ALCOHOL:  none


Drugs:  None





Current Problem List


Problem List


Problems


Medical Problems:


(1) Lactic acidosis


Status: Acute  





(2) Shortness of breath


Status: Acute  





(3) Sinus tachycardia


Status: Acute  





(4) Suspected 2019 novel coronavirus infection


Status: Acute  











Current Medications


Current Medications





Current Medications


Sodium Chloride 1,000 ml @  1,000 mls/hr 1X  ONCE IV  Last administered on 

21at 00:40;  Start 21 at 23:45;  Stop 21 at 00:44;  Status DC


Aspirin (Ecotrin) 325 mg 1X  ONCE PO  Last administered on 21at 00:40;  

Start 21 at 23:45;  Stop 21 at 23:46;  Status DC


Sodium Chloride 1,000 ml @  125 mls/hr 1X  ONCE IV  Last administered on 

21at 03:30;  Start 21 at 02:30;  Stop 21 at 10:29;  Status DC


Iohexol (Omnipaque 350 Mg/ml) 100 ml 1X  ONCE IV  Last administered on 21at 

03:05;  Start 21 at 03:00;  Stop 21 at 03:01;  Status DC


Info (CONTRAST GIVEN -- Rx MONITORING) 1 each PRN DAILY  PRN MC SEE COMMENTS;  

Start 21 at 02:45;  Stop 21 at 02:44


Lorazepam (Ativan Inj) 1 mg 1X  ONCE IVP  Last administered on 21at 05:47;  

Start 21 at 04:30;  Stop 21 at 04:31;  Status DC


Insulin Human Lispro (HumaLOG) 0-5 UNITS TIDWMEALS SQ  Last administered on 

21at 09:27;  Start 21 at 08:00


Dextrose (Dextrose 50%-Water Syringe) 12.5 gm PRN Q15MIN  PRN IV SEE COMMENTS;  

Start 21 at 04:00


Sodium Chloride 1,000 ml @  100 mls/hr 1X  ONCE IV ;  Start 21 at 04:00;  

Stop 21 at 04:10;  Status DC


Influenza Virus Vaccine Quadrival (Fluzone Quad [id number] Syringe) 0.5 ml ONCE 

ONCE VAX IM ;  Start 21 at 21:00;  Stop 21 at 21:01


Aspirin (Ecotrin) 81 mg DAILY PO  Last administered on 21at 13:10;  Start 

21 at 13:00


EZETIMIBE (Zetia) 10 mg DAILY PO  Last administered on 21at 13:10;  Start 

21 at 13:00


Levothyroxine Sodium (Synthroid) 88 mcg DAILY06 PO  Last administered on 

21at 13:12;  Start 21 at 12:30


Pramipexole Dihydrochloride (miraPEX) 0.75 mg QHS PO ;  Start 21 at 21:00


Trazodone HCl (Desyrel) 50 mg QHS PO ;  Start 21 at 21:00


Bupropion HCl (Wellbutrin Xl) 300 mg DAILY PO  Last administered on 21at 

13:38;  Start 21 at 13:00


Calcium/Vitamin D (Oscal D 500mg/ 200uts) 1 tab BIDWMEALS PO ;  Start 21 at 

17:00


Duloxetine HCl (Cymbalta) 60 mg BID PO ;  Start 21 at 13:00;  Status Cancel


Metoprolol Succinate (Toprol Xl) 25 mg DAILY PO  Last administered on 21at 

13:11;  Start 21 at 13:00


Venlafaxine HCl (Effexor) 50 mg TID PO  Last administered on 21at 13:13;  

Start 21 at 14:00


Vitamin B Complex (Christopher B) 1 tab DAILY PO  Last administered on 21at 13:10; 

Start 21 at 13:00


Sennosides (Senna) 17.2 mg PRN BID  PRN PO CONSTIPATION;  Start 21 at 12:15


Docusate Sodium (Colace) 100 mg PRN DAILY  PRN PO HARD STOOLS;  Start 21 at 

12:15


Ondansetron HCl (Zofran) 4 mg PRN Q6HRS  PRN IVP NAUSEA/VOMITING;  Start 21 

at 12:15


Insulin Human Lispro (HumaLOG) 0-7 UNITS TIDWMEALS SQ ;  Start 21 at 17:00


Dextrose (Dextrose 50%-Water Syringe) 12.5 gm PRN Q15MIN  PRN IV SEE COMMENTS;  

Start 21 at 12:15


Acetaminophen (Tylenol) 650 mg PRN Q4HRS  PRN PO TEMP OVER 100.4F OR MILD PAIN; 

Start 21 at 12:15


Enoxaparin Sodium (Lovenox 40mg Syringe) 40 mg Q24H SQ  Last administered on 

21at 13:12;  Start 21 at 13:00





Active Scripts


Active


Reported


Ezetimibe 10 Mg Tablet 1 Tab PO DAILY


Venlafaxine Hcl Er (Venlafaxine Hcl) 150 Mg Cap.er.24h 1 Cap PO DAILY


Neomycin-Polymyxin-Hc Ear Susp (Neomycin/Polymyxin B Sulf/Hc) 10 Ml Drops.susp 

2-3 Drop AS TID


Mupirocin Ointment (Mupirocin) 22 Gm Oint...g. 1 Arcadio TP TID


Cozaar ** (Losartan Potassium) 50 Mg Tablet 100 Mg PO DAILY


Toprol Xl (Metoprolol Succinate) 50 Mg Tab.er.24h 25 Mg PO DAILY


Super B Complex (Vitamin B Complex & Vit C No.4) 150 Mg Tablet 150 Mg PO DAILY


Calcium + D Soft Chewable Tab (Ca Carbonate/Vitamin D3/Vit K) 1 Each Tab.chew 2 

Each PO DAILY


Januvia (Sitagliptin Phosphate) 100 Mg Tablet 1 Tab PO DAILY


Bupropion Xl (Bupropion Hcl) 300 Mg Tab.er.24h 1 Tab PO DAILY


Aspir-Low (Aspirin) 81 Mg Tablet.dr 1 Tab PO DAILY


Mirapex (Pramipexole Di-Hcl) 0.25 Mg Tablet 1 Tab PO QHS


Hydrocodone-Apap 7.5-325  ** (Hydrocodone Bit/Acetaminophen) 1 Each Tablet 1 Tab

PO PRN Q6HRS PRN


Trazodone Hcl 50 Mg Tablet 50 Mg PO DAILY


Glipizide 5 Mg Tablet 5 Mg PO DAILY


Cymbalta (Duloxetine Hcl) 60 Mg Capsule.dr 1 Cap PO BID


Metformin Hcl 1,000 Mg Tablet 1,000 Mg PO DAILYWBKFT


Levothyroxine Sodium 88 Mcg Tablet 1 Tab PO DAILY





Allergies


Allergies:  


Coded Allergies:  


     ACE Inhibitors (Verified  Allergy, Intermediate, 18)


     amlodipine (Verified  Allergy, Intermediate, 18)


     rosuvastatin (Verified  Allergy, Intermediate, 18)


   muscle cramps





ROS


General:  YES: Fatigue


Respiratory:  YES: Shortness of breath, SOB with excertion





Physical Exam


General:  mild distress


HEENT:  Atraumatic


Lungs:  Other (Mildly decreased breath sounds)


Heart:  Other (Regular rhythm with a rate of 100)


Abdomen:  Normal bowel sounds





Vitals


VITALS





Vital Signs








  Date Time  Temp Pulse Resp B/P (MAP) Pulse Ox O2 Delivery O2 Flow Rate FiO2


 


21 15:00 97.6 114 22 176/84 (114) 95 Room Air  





 97.6       











Labs


Labs





Laboratory Tests








Test


 21


00:35 21


02:08 21


05:25 21


08:20


 


White Blood Count


 8.2 x10^3/uL


(4.0-11.0) 


 


 





 


Red Blood Count


 4.16 x10^6/uL


(3.50-5.40) 


 


 





 


Hemoglobin


 13.5 g/dL


(12.0-15.5) 


 


 





 


Hematocrit


 40.2 %


(36.0-47.0) 


 


 





 


Mean Corpuscular Volume 97 fL ()    


 


Mean Corpuscular Hemoglobin 33 pg (25-35)    


 


Mean Corpuscular Hemoglobin


Concent 34 g/dL


(31-37) 


 


 





 


Red Cell Distribution Width


 13.3 %


(11.5-14.5) 


 


 





 


Platelet Count


 245 x10^3/uL


(140-400) 


 


 





 


Neutrophils (%) (Auto) 61 % (31-73)    


 


Lymphocytes (%) (Auto) 26 % (24-48)    


 


Monocytes (%) (Auto) 9 % (0-9)    


 


Eosinophils (%) (Auto) 3 % (0-3)    


 


Basophils (%) (Auto) 1 % (0-3)    


 


Neutrophils # (Auto)


 5.0 x10^3/uL


(1.8-7.7) 


 


 





 


Lymphocytes # (Auto)


 2.1 x10^3/uL


(1.0-4.8) 


 


 





 


Monocytes # (Auto)


 0.7 x10^3/uL


(0.0-1.1) 


 


 





 


Eosinophils # (Auto)


 0.3 x10^3/uL


(0.0-0.7) 


 


 





 


Basophils # (Auto)


 0.1 x10^3/uL


(0.0-0.2) 


 


 





 


D-Dimer (Flori)


 1.39 ug/mlFEU


(0.00-0.50) 


 


 





 


Sodium Level


 140 mmol/L


(136-145) 


 


 





 


Potassium Level


 4.2 mmol/L


(3.5-5.1) 


 


 





 


Chloride Level


 102 mmol/L


() 


 


 





 


Carbon Dioxide Level


 26 mmol/L


(21-32) 


 


 





 


Anion Gap 12 (6-14)    


 


Blood Urea Nitrogen


 18 mg/dL


(7-20) 


 


 





 


Creatinine


 1.2 mg/dL


(0.6-1.0) 


 


 





 


Estimated GFR


(Cockcroft-Gault) 43.3 


 


 


 





 


BUN/Creatinine Ratio 15 (6-20)    


 


Glucose Level


 152 mg/dL


(70-99) 


 


 





 


Lactic Acid Level


 3.5 mmol/L


(0.4-2.0) 


 2.1 mmol/L


(0.4-2.0) 





 


Calcium Level


 9.5 mg/dL


(8.5-10.1) 


 


 





 


Magnesium Level


 1.9 mg/dL


(1.8-2.4) 


 


 





 


Total Bilirubin


 0.4 mg/dL


(0.2-1.0) 


 


 





 


Aspartate Amino Transf


(AST/SGOT) 23 U/L (15-37) 


 


 


 





 


Alanine Aminotransferase


(ALT/SGPT) 30 U/L (14-59) 


 


 


 





 


Alkaline Phosphatase


 94 U/L


() 


 


 





 


Creatine Kinase


 90 U/L


() 


 


 





 


Creatine Kinase MB (Mass)


 0.8 ng/mL


(0.0-3.6) 


 


 





 


Creatine Kinase MB Relative


Index 0.9 % (0-4) 


 


 


 





 


Troponin I Quantitative


 < 0.017 ng/mL


(0.000-0.055) 


 < 0.017 ng/mL


(0.000-0.055) 





 


NT-Pro-B-Type Natriuretic


Peptide 128 pg/mL


(0-449) 


 


 





 


Total Protein


 7.4 g/dL


(6.4-8.2) 


 


 





 


Albumin


 3.7 g/dL


(3.4-5.0) 


 


 





 


Albumin/Globulin Ratio 1.0 (1.0-1.7)    


 


Thyroid Stimulating Hormone


(TSH) 5.754 uIU/mL


(0.358-3.74) 


 


 





 


Free Thyroxine


 0.86 ng/dL


(0.76-1.46) 


 


 





 


Free Triiodothyronine (T3)


pg/mL 2.28 pg/mL


(2.18-3.98) 


 


 





 


Urine Collection Type  Unknown   


 


Urine Color  Yellow   


 


Urine Clarity  Clear   


 


Urine pH  6.5 (<5.0-8.0)   


 


Urine Specific Gravity


 


 <=1.005


(1.000-1.030) 


 





 


Urine Protein


 


 Negative mg/dL


(NEG-TRACE) 


 





 


Urine Glucose (UA)


 


 Negative mg/dL


(NEG) 


 





 


Urine Ketones (Stick)


 


 Negative mg/dL


(NEG) 


 





 


Urine Blood  Negative (NEG)   


 


Urine Nitrite  Negative (NEG)   


 


Urine Bilirubin  Negative (NEG)   


 


Urine Urobilinogen Dipstick


 


 0.2 mg/dL (0.2


mg/dL) 


 





 


Urine Leukocyte Esterase  Small (NEG)   


 


Urine RBC  1-2 /HPF (0-2)   


 


Urine WBC  1-4 /HPF (0-4)   


 


Urine Squamous Epithelial


Cells 


 Few /LPF 


 


 





 


Urine Bacteria


 


 Few /HPF


(0-FEW) 


 





 


Urine Mucus  Slight /LPF   


 


Glucose (Fingerstick)


 


 


 


 170 mg/dL


(70-99)


 


Test


 21


11:25 21


11:45 21


16:49 





 


Troponin I Quantitative


 < 0.017 ng/mL


(0.000-0.055) 


 


 





 


Glucose (Fingerstick)


 


 145 mg/dL


(70-99) 156 mg/dL


(70-99) 











Laboratory Tests








Test


 21


00:35 21


02:08 21


05:25 21


08:20


 


White Blood Count


 8.2 x10^3/uL


(4.0-11.0) 


 


 





 


Red Blood Count


 4.16 x10^6/uL


(3.50-5.40) 


 


 





 


Hemoglobin


 13.5 g/dL


(12.0-15.5) 


 


 





 


Hematocrit


 40.2 %


(36.0-47.0) 


 


 





 


Mean Corpuscular Volume 97 fL ()    


 


Mean Corpuscular Hemoglobin 33 pg (25-35)    


 


Mean Corpuscular Hemoglobin


Concent 34 g/dL


(31-37) 


 


 





 


Red Cell Distribution Width


 13.3 %


(11.5-14.5) 


 


 





 


Platelet Count


 245 x10^3/uL


(140-400) 


 


 





 


Neutrophils (%) (Auto) 61 % (31-73)    


 


Lymphocytes (%) (Auto) 26 % (24-48)    


 


Monocytes (%) (Auto) 9 % (0-9)    


 


Eosinophils (%) (Auto) 3 % (0-3)    


 


Basophils (%) (Auto) 1 % (0-3)    


 


Neutrophils # (Auto)


 5.0 x10^3/uL


(1.8-7.7) 


 


 





 


Lymphocytes # (Auto)


 2.1 x10^3/uL


(1.0-4.8) 


 


 





 


Monocytes # (Auto)


 0.7 x10^3/uL


(0.0-1.1) 


 


 





 


Eosinophils # (Auto)


 0.3 x10^3/uL


(0.0-0.7) 


 


 





 


Basophils # (Auto)


 0.1 x10^3/uL


(0.0-0.2) 


 


 





 


D-Dimer (Flori)


 1.39 ug/mlFEU


(0.00-0.50) 


 


 





 


Sodium Level


 140 mmol/L


(136-145) 


 


 





 


Potassium Level


 4.2 mmol/L


(3.5-5.1) 


 


 





 


Chloride Level


 102 mmol/L


() 


 


 





 


Carbon Dioxide Level


 26 mmol/L


(21-32) 


 


 





 


Anion Gap 12 (6-14)    


 


Blood Urea Nitrogen


 18 mg/dL


(7-20) 


 


 





 


Creatinine


 1.2 mg/dL


(0.6-1.0) 


 


 





 


Estimated GFR


(Cockcroft-Gault) 43.3 


 


 


 





 


BUN/Creatinine Ratio 15 (6-20)    


 


Glucose Level


 152 mg/dL


(70-99) 


 


 





 


Lactic Acid Level


 3.5 mmol/L


(0.4-2.0) 


 2.1 mmol/L


(0.4-2.0) 





 


Calcium Level


 9.5 mg/dL


(8.5-10.1) 


 


 





 


Magnesium Level


 1.9 mg/dL


(1.8-2.4) 


 


 





 


Total Bilirubin


 0.4 mg/dL


(0.2-1.0) 


 


 





 


Aspartate Amino Transf


(AST/SGOT) 23 U/L (15-37) 


 


 


 





 


Alanine Aminotransferase


(ALT/SGPT) 30 U/L (14-59) 


 


 


 





 


Alkaline Phosphatase


 94 U/L


() 


 


 





 


Creatine Kinase


 90 U/L


() 


 


 





 


Creatine Kinase MB (Mass)


 0.8 ng/mL


(0.0-3.6) 


 


 





 


Creatine Kinase MB Relative


Index 0.9 % (0-4) 


 


 


 





 


Troponin I Quantitative


 < 0.017 ng/mL


(0.000-0.055) 


 < 0.017 ng/mL


(0.000-0.055) 





 


NT-Pro-B-Type Natriuretic


Peptide 128 pg/mL


(0-449) 


 


 





 


Total Protein


 7.4 g/dL


(6.4-8.2) 


 


 





 


Albumin


 3.7 g/dL


(3.4-5.0) 


 


 





 


Albumin/Globulin Ratio 1.0 (1.0-1.7)    


 


Thyroid Stimulating Hormone


(TSH) 5.754 uIU/mL


(0.358-3.74) 


 


 





 


Free Thyroxine


 0.86 ng/dL


(0.76-1.46) 


 


 





 


Free Triiodothyronine (T3)


pg/mL 2.28 pg/mL


(2.18-3.98) 


 


 





 


Urine Collection Type  Unknown   


 


Urine Color  Yellow   


 


Urine Clarity  Clear   


 


Urine pH  6.5 (<5.0-8.0)   


 


Urine Specific Gravity


 


 <=1.005


(1.000-1.030) 


 





 


Urine Protein


 


 Negative mg/dL


(NEG-TRACE) 


 





 


Urine Glucose (UA)


 


 Negative mg/dL


(NEG) 


 





 


Urine Ketones (Stick)


 


 Negative mg/dL


(NEG) 


 





 


Urine Blood  Negative (NEG)   


 


Urine Nitrite  Negative (NEG)   


 


Urine Bilirubin  Negative (NEG)   


 


Urine Urobilinogen Dipstick


 


 0.2 mg/dL (0.2


mg/dL) 


 





 


Urine Leukocyte Esterase  Small (NEG)   


 


Urine RBC  1-2 /HPF (0-2)   


 


Urine WBC  1-4 /HPF (0-4)   


 


Urine Squamous Epithelial


Cells 


 Few /LPF 


 


 





 


Urine Bacteria


 


 Few /HPF


(0-FEW) 


 





 


Urine Mucus  Slight /LPF   


 


Glucose (Fingerstick)


 


 


 


 170 mg/dL


(70-99)


 


Test


 21


11:25 21


11:45 21


16:49 





 


Troponin I Quantitative


 < 0.017 ng/mL


(0.000-0.055) 


 


 





 


Glucose (Fingerstick)


 


 145 mg/dL


(70-99) 156 mg/dL


(70-99) 














Images


Images


CTA of the chest as above shows no pulmonary emboli.  It does show mild 

pulmonary vascular congestion.





Assessment/Plan


Assessment/Plan


1.  Increased shortness of breath.  No pulmonary embolism but possible mild 

heart failure.  Agree with present treatment.  Patient is being ruled out for 

Covid.  We will check an echo based on Covid guidelines.





2.  History of possible coronary artery disease.  Troponins are normal x2.  No 

acute ischemic EKG changes.  I agree with present medications and will attempt 

to find old records.





3.  Tachycardia.  Patient has been having a sinus tachycardia that appears to be

largely reactive.  We will continue on monitoring and treat her underlying 

conditions.





4.  History of hyperlipidemia.  We will continue medications and check lab.





5.  Hypothyroidism.  Lab pending.  As per the primary service.





6.  History of breast cancer.





Thank you for allowing us to participate in the care of your pleasant patient.











KLALIE VILLARREAL MD             2021 17:09

## 2021-01-02 NOTE — EKG
[facility]

              [address] [place] [address]

Test Date:    2021               Test Time:    00:45:59

Pat Name:     MONICA WILSON              Department:   

Patient ID:   [id number]           Room:          

Gender:       F                        Technician:   

:          [id number]               Requested By: MANDY CEDEÑO

Order Number: [id number]           Reading MD:   Jerrod Reeves MD

                                 Measurements

Intervals                              Axis          

Rate:         122                      P:            -8

OR:           152                      QRS:          -24

QRSD:         92                       T:            48

QT:           316                                    

QTc:          451                                    

                           Interpretive Statements

SINUS TACHYCARDIA

CONSIDER PRIOR INFERIOR INFARCT.

Electronically Signed On 2021 6:55:04 CST by Jerrod Reeves MD

## 2021-01-02 NOTE — PHYS DOC
Past Medical History


Past Medical History:  Arthritis, Cancer, Diabetes-Type II, High Cholesterol, 

Hypertension, Hypothyroid, Other


Additional Past Medical Histor:  SPINAL STENOSIS, LYMPHOMER, RESTLESS LEG 

SYNDROME. BREAST CA


Past Surgical History:  Angioplasty, Appendectomy, Hysterectomy, Tonsillectomy, 

Other


Additional Past Surgical Histo:  R MASECTOMY, LUMPECTOMY, FIBROIDECTOMY


Past Surgical History


Partial thyroidectomy


Smoking Status:  Never Smoker


Alcohol Use:  None


Drug Use:  None





General Adult


EDM:


Chief Complaint:  SHORTNESS OF BREATH





HPI:


HPI:





80-year-old female presents with report of shortness of breath that started at 

1400 today while organizing things in her house.  Patient denies any fever or 

chills.  Denies cough.  Denies nasal congestion.  Denies known sick contacts.  

Denies known exposure to COVID-19.  Patient does report some palpitations.  

Patient does report history of partial thyroidectomy.  Reports has been 

compliant with taking her thyroid medication.





Review of Systems:


Review of Systems:





Constitutional: Denies fever or chills 


Eyes: Denies redness or eye pain 


HENT: Denies nasal congestion or sore throat


Respiratory: Denies cough; reports shortness of breath 


Cardiovascular: Denies chest pain; reports palpitations


GI: Denies abdominal pain, nausea, or vomiting


: Denies dysuria or hematuria


Musculoskeletal: Denies back pain or joint pain


Integument: Denies rash or skin lesions 


Neurologic: Denies headache, focal weakness or sensory changes





Complete systems were reviewed and found to be within normal limits, except as 

documented in this note.





Current Medications:





Current Medications








 Medications


  (Trade)  Dose


 Ordered  Sig/Yvonne  Start Time


 Stop Time Status Last Admin


Dose Admin


 


 Aspirin


  (Ecotrin)  325 mg  1X  ONCE  1/1/21 23:45


 1/1/21 23:46 DC 1/2/21 00:40


325 MG


 


 Sodium Chloride  1,000 ml @ 


 1,000 mls/hr  1X  ONCE  1/1/21 23:45


 1/2/21 00:44 DC 1/2/21 00:40


1,000 MLS/HR











Allergies:


Allergies:





Allergies








Coded Allergies Type Severity Reaction Last Updated Verified


 


  ACE Inhibitors Allergy Intermediate  12/23/18 Yes


 


  amlodipine Allergy Intermediate  12/23/18 Yes


 


  rosuvastatin Allergy Intermediate  12/23/18 Yes











Physical Exam:


PE:





Constitutional: Well developed, well nourished, no acute distress, non-toxic 

appearance


HENT: Normocephalic, atraumatic


Eyes: Conjunctiva normal, no discharge


Neck: Normal range of motion, no tenderness, supple


Lungs & Thorax:  No respiratory distress, equal chest rise and fall


Cardiology: Tachycardia, bilateral radial pulses +2


Abdomen: Soft, no tenderness


Skin: Warm, dry, no erythema, no rash


Extremities: No tenderness, ROM intact, no edema


Neurologic: Alert and oriented X 3, normal motor function, normal sensory 

function, no focal deficits noted


Psychologic: Affect anxious, judgment normal





Current Patient Data:


Vital Signs:





                                   Vital Signs








  Date Time  Temp Pulse Resp B/P (MAP) Pulse Ox O2 Delivery O2 Flow Rate FiO2


 


1/1/21 23:00 98.3 122 20 118/78 (91) 96 Room Air  





 98.3       











EKG:


EKG:


@0045 Sinus tachycardia at 122bpm, NO ST elevation, QRS 92ms, QT/QTc 316/451ms





Radiology/Procedures:


Radiology/Procedures:


PROCEDURE: CT ANGIOGRAPHY CHEST





RS Compliance Statement:





One or more of the following individualized dose reduction techniques were 

utilized for this examination:  


1. Automated exposure control  


2. Adjustment of the mA and/or kV according to patient size  


3. Use of iterative reconstruction technique





CTA CHEST 1/2/2021 2:35 AM





Indication: Shortness of air, elevated d-dimer





COMPARISON: None available.





TECHNIQUE: Multiple axial CT images of the chest were obtained after intravenous

administration of 100 cc Omnipaque 350. Coronal and sagittal reformats are 

provided. Maximum intensity projection images are provided.





FINDINGS:





Right thyroid gland is normal. The thyroid gland is either diminutive or absent.

Precarinal lymph node measures 9 mm. No pathologically enlarged thoracic lymph 

nodes are identified. Heart size is borderline enlarged. Mild three-vessel 

coronary artery vascular calcifications are present. Evaluation of the pulmonary

arterial system is limited by respiratory motion. This symptoms evaluation of 

the right upper lobe pulmonary arteries. The central pulmonary artery, main 

pulmonary arteries and bilateral lower lobe pulmonary arteries are widely 

patent. Lingula and left upper lobe pulmonary arteries are patent without 

filling defects to suggest acute or chronic pulmonary emboli. Respiratory motion

limits evaluation of the lungs. There is mild pulmonary vascular congestion. No 

definite solid noncalcified pulmonary nodule. No pleural effusions or 

pneumothorax. Mild gallbladder wall calcification is identified. Simple cyst 

identified in the super pole left kidney measuring 9 mm. No suspicious osseous 

abnormality.





IMPRESSION:


1. No evidence for acute or chronic pulmonary embolism. Evaluation of the right 

upper lobe pulmonary arteries limited by motion artifact. 





2. Evaluation of the lungs limited by motion. There is mild pulmonary vascular 

congestion and mild cardiomegaly as may be seen with congestive heart failure. 

No focal airspace consolidation.





Electronically signed by: Vianney Grant MD (1/2/2021 3:10 AM) [facility]





Course & Med Decision Making:


Course & Med Decision Making


Pertinent Labs and Imaging studies reviewed. (See chart for details)





Patient presents with shortness of breath that started today at 1400.  Does 

appear to have some anxiety component.  Anxiety addressed.  IV fluid hydration 

given.  Labs obtained and posted to chart.  CTA chest without signs of PE.  

Cannot exclude COVID-19.  Covid precautions in place.  Covid testing pending.  

Patient did continue to be tachycardic up into the 120s to 130s despite IV fluid

hydration.  Lactic acid significantly elevated. 





Patient requiring admission for further evaluation and treatment. Discussed with

Dr. Leung (hospitalist) who is in agreement with admission. Discussed findings and 

plan with patient, who acknowledges understanding and agreement.





COVID-19 CRITERIA:    The patient was evaluated during the global COVID-19 

pandemic, and that diagnosis was suspected/considered upon their initial 

presentation.  Their evaluation, treatment and testing was consistent with 

current guidelines for patients who present with complaints or symptoms that may

be related to COVID-19.











Dragon Disclaimer:


Dragon Disclaimer:


This electronic medical record was generated, in whole or in part, using a voice

recognition dictation system.





Departure


Departure


Impression:  


   Primary Impression:  


   Shortness of breath


   Additional Impressions:  


   Lactic acidosis


   Sinus tachycardia


   Suspected 2019 novel coronavirus infection


Disposition:  09 ADMITTED AS INPT THIS HOSP


Admitting Physician:  FLACA (Madhu)


Condition:  STABLE


Referrals:  


BRADFORD SAN MD (PCP)





COVID-19 Assessment:


COVID-19 Patient Risks:


Age 65 or older:  Yes


Sign of co-morbidity:  Yes


Exp to person + for COVID:  No


Exp to PUI:  No


Travel from affected area:  No


Lower respiratory symptoms:  Yes


Fever:  No


Other:  Yes





PPE Use:


Full PPE with N95 mask or PAPR:  Yes





Critical Care Time


Critical care time was 30 minutes which includes time at bedside, spent in 

discussion of patient's care with specialists and/or family members, with 

interpretation of laboratory and/or radiological studies and is exclusive of 

procedures.











MANDY CEDEÑO DO              Jan 2, 2021 00:57

## 2021-01-03 VITALS — SYSTOLIC BLOOD PRESSURE: 151 MMHG | DIASTOLIC BLOOD PRESSURE: 64 MMHG

## 2021-01-03 VITALS — DIASTOLIC BLOOD PRESSURE: 72 MMHG | SYSTOLIC BLOOD PRESSURE: 145 MMHG

## 2021-01-03 VITALS — SYSTOLIC BLOOD PRESSURE: 152 MMHG | DIASTOLIC BLOOD PRESSURE: 82 MMHG

## 2021-01-03 VITALS — DIASTOLIC BLOOD PRESSURE: 89 MMHG | SYSTOLIC BLOOD PRESSURE: 147 MMHG

## 2021-01-03 VITALS — SYSTOLIC BLOOD PRESSURE: 164 MMHG | DIASTOLIC BLOOD PRESSURE: 78 MMHG

## 2021-01-03 VITALS — SYSTOLIC BLOOD PRESSURE: 141 MMHG | DIASTOLIC BLOOD PRESSURE: 71 MMHG

## 2021-01-03 LAB
ANION GAP SERPL CALC-SCNC: 10 MMOL/L (ref 6–14)
BASOPHILS # BLD AUTO: 0 X10^3/UL (ref 0–0.2)
BASOPHILS NFR BLD: 1 % (ref 0–3)
BUN SERPL-MCNC: 17 MG/DL (ref 7–20)
CALCIUM SERPL-MCNC: 9.5 MG/DL (ref 8.5–10.1)
CHLORIDE SERPL-SCNC: 105 MMOL/L (ref 98–107)
CO2 SERPL-SCNC: 27 MMOL/L (ref 21–32)
CREAT SERPL-MCNC: 1.3 MG/DL (ref 0.6–1)
EOSINOPHIL NFR BLD: 0.4 X10^3/UL (ref 0–0.7)
EOSINOPHIL NFR BLD: 6 % (ref 0–3)
ERYTHROCYTE [DISTWIDTH] IN BLOOD BY AUTOMATED COUNT: 13.2 % (ref 11.5–14.5)
GFR SERPLBLD BASED ON 1.73 SQ M-ARVRAT: 39.5 ML/MIN
GLUCOSE SERPL-MCNC: 156 MG/DL (ref 70–99)
HCT VFR BLD CALC: 39 % (ref 36–47)
HGB BLD-MCNC: 13.2 G/DL (ref 12–15.5)
LYMPHOCYTES # BLD: 1.7 X10^3/UL (ref 1–4.8)
LYMPHOCYTES NFR BLD AUTO: 25 % (ref 24–48)
MAGNESIUM SERPL-MCNC: 2 MG/DL (ref 1.8–2.4)
MCH RBC QN AUTO: 33 PG (ref 25–35)
MCHC RBC AUTO-ENTMCNC: 34 G/DL (ref 31–37)
MCV RBC AUTO: 97 FL (ref 79–100)
MONO #: 0.6 X10^3/UL (ref 0–1.1)
MONOCYTES NFR BLD: 10 % (ref 0–9)
NEUT #: 3.9 X10^3/UL (ref 1.8–7.7)
NEUTROPHILS NFR BLD AUTO: 59 % (ref 31–73)
PHOSPHATE SERPL-MCNC: 3.3 MG/DL (ref 2.6–4.7)
PLATELET # BLD AUTO: 230 X10^3/UL (ref 140–400)
POTASSIUM SERPL-SCNC: 4.2 MMOL/L (ref 3.5–5.1)
RBC # BLD AUTO: 4.02 X10^6/UL (ref 3.5–5.4)
SODIUM SERPL-SCNC: 142 MMOL/L (ref 136–145)
WBC # BLD AUTO: 6.6 X10^3/UL (ref 4–11)

## 2021-01-03 RX ADMIN — EZETIMIBE SCH MG: 10 TABLET ORAL at 09:47

## 2021-01-03 RX ADMIN — INSULIN LISPRO SCH UNITS: 100 INJECTION, SOLUTION INTRAVENOUS; SUBCUTANEOUS at 09:49

## 2021-01-03 RX ADMIN — ASPIRIN SCH MG: 81 TABLET, COATED ORAL at 09:47

## 2021-01-03 RX ADMIN — VENLAFAXINE SCH MG: 50 TABLET ORAL at 13:34

## 2021-01-03 RX ADMIN — VENLAFAXINE SCH MG: 50 TABLET ORAL at 19:58

## 2021-01-03 RX ADMIN — VENLAFAXINE SCH MG: 50 TABLET ORAL at 09:47

## 2021-01-03 RX ADMIN — PRAMIPEXOLE DIHYDROCHLORIDE SCH MG: 0.25 TABLET ORAL at 19:58

## 2021-01-03 RX ADMIN — CALCIUM CARBONATE-VITAMIN D TAB 500 MG-200 UNIT SCH TAB: 500-200 TAB at 17:56

## 2021-01-03 RX ADMIN — TRAZODONE HYDROCHLORIDE SCH MG: 50 TABLET ORAL at 19:58

## 2021-01-03 RX ADMIN — Medication SCH TAB: at 09:47

## 2021-01-03 RX ADMIN — CALCIUM CARBONATE-VITAMIN D TAB 500 MG-200 UNIT SCH TAB: 500-200 TAB at 09:47

## 2021-01-03 RX ADMIN — METOPROLOL SUCCINATE SCH MG: 25 TABLET, EXTENDED RELEASE ORAL at 09:47

## 2021-01-03 RX ADMIN — BUPROPION HYDROCHLORIDE SCH MG: 150 TABLET, FILM COATED, EXTENDED RELEASE ORAL at 09:47

## 2021-01-03 RX ADMIN — INSULIN LISPRO SCH UNITS: 100 INJECTION, SOLUTION INTRAVENOUS; SUBCUTANEOUS at 17:59

## 2021-01-03 RX ADMIN — LEVOTHYROXINE SODIUM SCH MCG: 88 TABLET ORAL at 07:37

## 2021-01-03 RX ADMIN — INSULIN LISPRO SCH UNITS: 100 INJECTION, SOLUTION INTRAVENOUS; SUBCUTANEOUS at 13:05

## 2021-01-03 NOTE — PDOC
PROGRESS NOTES


Date of Service


DATE: 1/3/21 


TIME: 15:58





Subjective


Subjective


Patient seen and examined





Objective


Objective





Vital Signs








  Date Time  Temp Pulse Resp B/P (MAP) Pulse Ox O2 Delivery O2 Flow Rate FiO2


 


1/3/21 15:00 96.6 104 18 147/89 (108) 96 Room Air  





 96.6       














Intake and Output 


 


 1/3/21





 07:00


 


Intake Total 300 ml


 


Balance 300 ml


 


 


 


Intake Oral 300 ml


 


# Voids 1











Physical Exam


Abdomen:  Normal bowel sounds


Heart:  Regular rate


General:  mild distress


Lungs:  Other (Mildly decreased breath sounds)





Assessment


Assessment


Problems


Medical Problems:


(1) Lactic acidosis


Status: Acute  





(2) Shortness of breath


Status: Acute  





(3) Sinus tachycardia


Status: Acute  





(4) Suspected 2019 novel coronavirus infection


Status: Acute  





1.  Increased shortness of breath.  No pulmonary embolism but possible mild 

heart failure.  Improved today.  Covid negative.  Continue present treatment.  

Now that Covid negative we will check an echocardiogram.  





2.  History of possible coronary artery disease.  Troponins are normal x2.  No 

acute ischemic EKG changes.  I agree with present medications.





3.  Tachycardia.  Patient has been having a sinus tachycardia that appears to be

largely reactive.  Resolved.  





4.  History of hyperlipidemia.  We will continue medications.





5.  Hypothyroidism.  Lab pending.  As per the primary service.





6.  History of breast cancer.





Comment


Review of Relevant


I have reviewed the following items mahendra (where applicable) has been applied.


Labs





Laboratory Tests








Test


 1/2/21


00:35 1/2/21


02:08 1/2/21


05:25 1/2/21


08:20


 


White Blood Count


 8.2 x10^3/uL


(4.0-11.0) 


 


 





 


Red Blood Count


 4.16 x10^6/uL


(3.50-5.40) 


 


 





 


Hemoglobin


 13.5 g/dL


(12.0-15.5) 


 


 





 


Hematocrit


 40.2 %


(36.0-47.0) 


 


 





 


Mean Corpuscular Volume 97 fL ()    


 


Mean Corpuscular Hemoglobin 33 pg (25-35)    


 


Mean Corpuscular Hemoglobin


Concent 34 g/dL


(31-37) 


 


 





 


Red Cell Distribution Width


 13.3 %


(11.5-14.5) 


 


 





 


Platelet Count


 245 x10^3/uL


(140-400) 


 


 





 


Neutrophils (%) (Auto) 61 % (31-73)    


 


Lymphocytes (%) (Auto) 26 % (24-48)    


 


Monocytes (%) (Auto) 9 % (0-9)    


 


Eosinophils (%) (Auto) 3 % (0-3)    


 


Basophils (%) (Auto) 1 % (0-3)    


 


Neutrophils # (Auto)


 5.0 x10^3/uL


(1.8-7.7) 


 


 





 


Lymphocytes # (Auto)


 2.1 x10^3/uL


(1.0-4.8) 


 


 





 


Monocytes # (Auto)


 0.7 x10^3/uL


(0.0-1.1) 


 


 





 


Eosinophils # (Auto)


 0.3 x10^3/uL


(0.0-0.7) 


 


 





 


Basophils # (Auto)


 0.1 x10^3/uL


(0.0-0.2) 


 


 





 


D-Dimer (Flori)


 1.39 ug/mlFEU


(0.00-0.50) 


 


 





 


Sodium Level


 140 mmol/L


(136-145) 


 


 





 


Potassium Level


 4.2 mmol/L


(3.5-5.1) 


 


 





 


Chloride Level


 102 mmol/L


() 


 


 





 


Carbon Dioxide Level


 26 mmol/L


(21-32) 


 


 





 


Anion Gap 12 (6-14)    


 


Blood Urea Nitrogen


 18 mg/dL


(7-20) 


 


 





 


Creatinine


 1.2 mg/dL


(0.6-1.0) 


 


 





 


Estimated GFR


(Cockcroft-Gault) 43.3 


 


 


 





 


BUN/Creatinine Ratio 15 (6-20)    


 


Glucose Level


 152 mg/dL


(70-99) 


 


 





 


Lactic Acid Level


 3.5 mmol/L


(0.4-2.0) 


 2.1 mmol/L


(0.4-2.0) 





 


Calcium Level


 9.5 mg/dL


(8.5-10.1) 


 


 





 


Magnesium Level


 1.9 mg/dL


(1.8-2.4) 


 


 





 


Total Bilirubin


 0.4 mg/dL


(0.2-1.0) 


 


 





 


Aspartate Amino Transf


(AST/SGOT) 23 U/L (15-37) 


 


 


 





 


Alanine Aminotransferase


(ALT/SGPT) 30 U/L (14-59) 


 


 


 





 


Alkaline Phosphatase


 94 U/L


() 


 


 





 


Creatine Kinase


 90 U/L


() 


 


 





 


Creatine Kinase MB (Mass)


 0.8 ng/mL


(0.0-3.6) 


 


 





 


Creatine Kinase MB Relative


Index 0.9 % (0-4) 


 


 


 





 


Troponin I Quantitative


 < 0.017 ng/mL


(0.000-0.055) 


 < 0.017 ng/mL


(0.000-0.055) 





 


NT-Pro-B-Type Natriuretic


Peptide 128 pg/mL


(0-449) 


 


 





 


Total Protein


 7.4 g/dL


(6.4-8.2) 


 


 





 


Albumin


 3.7 g/dL


(3.4-5.0) 


 


 





 


Albumin/Globulin Ratio 1.0 (1.0-1.7)    


 


Thyroid Stimulating Hormone


(TSH) 5.754 uIU/mL


(0.358-3.74) 


 


 





 


Free Thyroxine


 0.86 ng/dL


(0.76-1.46) 


 


 





 


Free Triiodothyronine (T3)


pg/mL 2.28 pg/mL


(2.18-3.98) 


 


 





 


Urine Collection Type  Unknown   


 


Urine Color  Yellow   


 


Urine Clarity  Clear   


 


Urine pH  6.5 (<5.0-8.0)   


 


Urine Specific Gravity


 


 <=1.005


(1.000-1.030) 


 





 


Urine Protein


 


 Negative mg/dL


(NEG-TRACE) 


 





 


Urine Glucose (UA)


 


 Negative mg/dL


(NEG) 


 





 


Urine Ketones (Stick)


 


 Negative mg/dL


(NEG) 


 





 


Urine Blood  Negative (NEG)   


 


Urine Nitrite  Negative (NEG)   


 


Urine Bilirubin  Negative (NEG)   


 


Urine Urobilinogen Dipstick


 


 0.2 mg/dL (0.2


mg/dL) 


 





 


Urine Leukocyte Esterase  Small (NEG)   


 


Urine RBC  1-2 /HPF (0-2)   


 


Urine WBC  1-4 /HPF (0-4)   


 


Urine Squamous Epithelial


Cells 


 Few /LPF 


 


 





 


Urine Bacteria


 


 Few /HPF


(0-FEW) 


 





 


Urine Mucus  Slight /LPF   


 


Coronavirus (PCR)


 


 


 Not detected


(Not Detected) 





 


Glucose (Fingerstick)


 


 


 


 170 mg/dL


(70-99)


 


Test


 1/2/21


11:25 1/2/21


11:45 1/2/21


16:49 1/2/21


19:59


 


Troponin I Quantitative


 < 0.017 ng/mL


(0.000-0.055) 


 


 





 


Glucose (Fingerstick)


 


 145 mg/dL


(70-99) 156 mg/dL


(70-99) 204 mg/dL


(70-99)


 


Test


 1/3/21


04:00 1/3/21


07:54 


 





 


White Blood Count


 6.6 x10^3/uL


(4.0-11.0) 


 


 





 


Red Blood Count


 4.02 x10^6/uL


(3.50-5.40) 


 


 





 


Hemoglobin


 13.2 g/dL


(12.0-15.5) 


 


 





 


Hematocrit


 39.0 %


(36.0-47.0) 


 


 





 


Mean Corpuscular Volume 97 fL ()    


 


Mean Corpuscular Hemoglobin 33 pg (25-35)    


 


Mean Corpuscular Hemoglobin


Concent 34 g/dL


(31-37) 


 


 





 


Red Cell Distribution Width


 13.2 %


(11.5-14.5) 


 


 





 


Platelet Count


 230 x10^3/uL


(140-400) 


 


 





 


Neutrophils (%) (Auto) 59 % (31-73)    


 


Lymphocytes (%) (Auto) 25 % (24-48)    


 


Monocytes (%) (Auto) 10 % (0-9)    


 


Eosinophils (%) (Auto) 6 % (0-3)    


 


Basophils (%) (Auto) 1 % (0-3)    


 


Neutrophils # (Auto)


 3.9 x10^3/uL


(1.8-7.7) 


 


 





 


Lymphocytes # (Auto)


 1.7 x10^3/uL


(1.0-4.8) 


 


 





 


Monocytes # (Auto)


 0.6 x10^3/uL


(0.0-1.1) 


 


 





 


Eosinophils # (Auto)


 0.4 x10^3/uL


(0.0-0.7) 


 


 





 


Basophils # (Auto)


 0.0 x10^3/uL


(0.0-0.2) 


 


 





 


Sodium Level


 142 mmol/L


(136-145) 


 


 





 


Potassium Level


 4.2 mmol/L


(3.5-5.1) 


 


 





 


Chloride Level


 105 mmol/L


() 


 


 





 


Carbon Dioxide Level


 27 mmol/L


(21-32) 


 


 





 


Anion Gap 10 (6-14)    


 


Blood Urea Nitrogen


 17 mg/dL


(7-20) 


 


 





 


Creatinine


 1.3 mg/dL


(0.6-1.0) 


 


 





 


Estimated GFR


(Cockcroft-Gault) 39.5 


 


 


 





 


Glucose Level


 156 mg/dL


(70-99) 


 


 





 


Calcium Level


 9.5 mg/dL


(8.5-10.1) 


 


 





 


Phosphorus Level


 3.3 mg/dL


(2.6-4.7) 


 


 





 


Magnesium Level


 2.0 mg/dL


(1.8-2.4) 


 


 





 


Glucose (Fingerstick)


 


 168 mg/dL


(70-99) 


 











Laboratory Tests








Test


 1/2/21


16:49 1/2/21


19:59 1/3/21


04:00 1/3/21


07:54


 


Glucose (Fingerstick)


 156 mg/dL


(70-99) 204 mg/dL


(70-99) 


 168 mg/dL


(70-99)


 


White Blood Count


 


 


 6.6 x10^3/uL


(4.0-11.0) 





 


Red Blood Count


 


 


 4.02 x10^6/uL


(3.50-5.40) 





 


Hemoglobin


 


 


 13.2 g/dL


(12.0-15.5) 





 


Hematocrit


 


 


 39.0 %


(36.0-47.0) 





 


Mean Corpuscular Volume   97 fL ()  


 


Mean Corpuscular Hemoglobin   33 pg (25-35)  


 


Mean Corpuscular Hemoglobin


Concent 


 


 34 g/dL


(31-37) 





 


Red Cell Distribution Width


 


 


 13.2 %


(11.5-14.5) 





 


Platelet Count


 


 


 230 x10^3/uL


(140-400) 





 


Neutrophils (%) (Auto)   59 % (31-73)  


 


Lymphocytes (%) (Auto)   25 % (24-48)  


 


Monocytes (%) (Auto)   10 % (0-9)  


 


Eosinophils (%) (Auto)   6 % (0-3)  


 


Basophils (%) (Auto)   1 % (0-3)  


 


Neutrophils # (Auto)


 


 


 3.9 x10^3/uL


(1.8-7.7) 





 


Lymphocytes # (Auto)


 


 


 1.7 x10^3/uL


(1.0-4.8) 





 


Monocytes # (Auto)


 


 


 0.6 x10^3/uL


(0.0-1.1) 





 


Eosinophils # (Auto)


 


 


 0.4 x10^3/uL


(0.0-0.7) 





 


Basophils # (Auto)


 


 


 0.0 x10^3/uL


(0.0-0.2) 





 


Sodium Level


 


 


 142 mmol/L


(136-145) 





 


Potassium Level


 


 


 4.2 mmol/L


(3.5-5.1) 





 


Chloride Level


 


 


 105 mmol/L


() 





 


Carbon Dioxide Level


 


 


 27 mmol/L


(21-32) 





 


Anion Gap   10 (6-14)  


 


Blood Urea Nitrogen


 


 


 17 mg/dL


(7-20) 





 


Creatinine


 


 


 1.3 mg/dL


(0.6-1.0) 





 


Estimated GFR


(Cockcroft-Gault) 


 


 39.5 


 





 


Glucose Level


 


 


 156 mg/dL


(70-99) 





 


Calcium Level


 


 


 9.5 mg/dL


(8.5-10.1) 





 


Phosphorus Level


 


 


 3.3 mg/dL


(2.6-4.7) 





 


Magnesium Level


 


 


 2.0 mg/dL


(1.8-2.4) 











Microbiology


1/2/21 Urine Culture - Final, Complete


Medications





Current Medications


Sodium Chloride 1,000 ml @  1,000 mls/hr 1X  ONCE IV  Last administered on 

1/2/21at 00:40;  Start 1/1/21 at 23:45;  Stop 1/2/21 at 00:44;  Status DC


Aspirin (Ecotrin) 325 mg 1X  ONCE PO  Last administered on 1/2/21at 00:40;  

Start 1/1/21 at 23:45;  Stop 1/1/21 at 23:46;  Status DC


Sodium Chloride 1,000 ml @  125 mls/hr 1X  ONCE IV  Last administered on 

1/2/21at 03:30;  Start 1/2/21 at 02:30;  Stop 1/2/21 at 10:29;  Status DC


Iohexol (Omnipaque 350 Mg/ml) 100 ml 1X  ONCE IV  Last administered on 1/2/21at 

03:05;  Start 1/2/21 at 03:00;  Stop 1/2/21 at 03:01;  Status DC


Info (CONTRAST GIVEN -- Rx MONITORING) 1 each PRN DAILY  PRN MC SEE COMMENTS;  

Start 1/2/21 at 02:45;  Stop 1/4/21 at 02:44


Lorazepam (Ativan Inj) 1 mg 1X  ONCE IVP  Last administered on 1/2/21at 05:47;  

Start 1/2/21 at 04:30;  Stop 1/2/21 at 04:31;  Status DC


Insulin Human Lispro (HumaLOG) 0-5 UNITS TIDWMEALS SQ  Last administered on 

1/2/21at 09:27;  Start 1/2/21 at 08:00;  Stop 1/2/21 at 17:13;  Status DC


Dextrose (Dextrose 50%-Water Syringe) 12.5 gm PRN Q15MIN  PRN IV SEE COMMENTS;  

Start 1/2/21 at 04:00


Sodium Chloride 1,000 ml @  100 mls/hr 1X  ONCE IV ;  Start 1/2/21 at 04:00;  

Stop 1/2/21 at 04:10;  Status DC


Influenza Virus Vaccine Quadrival (Fluzone Quad [id number] Syringe) 0.5 ml ONCE 

ONCE VAX IM ;  Start 1/2/21 at 21:00;  Stop 1/2/21 at 21:01;  Status DC


Aspirin (Ecotrin) 81 mg DAILY PO  Last administered on 1/3/21at 09:47;  Start 

1/2/21 at 13:00


EZETIMIBE (Zetia) 10 mg DAILY PO  Last administered on 1/3/21at 09:47;  Start 

1/2/21 at 13:00


Levothyroxine Sodium (Synthroid) 88 mcg DAILY06 PO  Last administered on 

1/3/21at 07:37;  Start 1/2/21 at 12:30


Pramipexole Dihydrochloride (miraPEX) 0.75 mg QHS PO  Last administered on 

1/2/21at 21:49;  Start 1/2/21 at 21:00


Trazodone HCl (Desyrel) 50 mg QHS PO  Last administered on 1/2/21at 21:42;  

Start 1/2/21 at 21:00


Bupropion HCl (Wellbutrin Xl) 300 mg DAILY PO  Last administered on 1/3/21at 

09:47;  Start 1/2/21 at 13:00


Calcium/Vitamin D (Oscal D 500mg/ 200uts) 1 tab BIDWMEALS PO  Last administered 

on 1/3/21at 09:47;  Start 1/2/21 at 17:00


Duloxetine HCl (Cymbalta) 60 mg BID PO ;  Start 1/2/21 at 13:00;  Status Cancel


Metoprolol Succinate (Toprol Xl) 25 mg DAILY PO  Last administered on 1/3/21at 

09:47;  Start 1/2/21 at 13:00


Venlafaxine HCl (Effexor) 50 mg TID PO  Last administered on 1/3/21at 13:34;  

Start 1/2/21 at 14:00


Vitamin B Complex (Christopher B) 1 tab DAILY PO  Last administered on 1/3/21at 09:47; 

Start 1/2/21 at 13:00


Sennosides (Senna) 17.2 mg PRN BID  PRN PO CONSTIPATION;  Start 1/2/21 at 12:15


Docusate Sodium (Colace) 100 mg PRN DAILY  PRN PO HARD STOOLS;  Start 1/2/21 at 

12:15


Ondansetron HCl (Zofran) 4 mg PRN Q6HRS  PRN IVP NAUSEA/VOMITING;  Start 1/2/21 

at 12:15


Insulin Human Lispro (HumaLOG) 0-7 UNITS TIDWMEALS SQ  Last administered on 

1/3/21at 13:05;  Start 1/2/21 at 17:00


Dextrose (Dextrose 50%-Water Syringe) 12.5 gm PRN Q15MIN  PRN IV SEE COMMENTS;  

Start 1/2/21 at 12:15


Acetaminophen (Tylenol) 650 mg PRN Q4HRS  PRN PO TEMP OVER 100.4F OR MILD PAIN; 

Start 1/2/21 at 12:15


Enoxaparin Sodium (Lovenox 40mg Syringe) 40 mg Q24H SQ  Last administered on 

1/2/21at 13:12;  Start 1/2/21 at 13:00;  Stop 1/3/21 at 13:25;  Status DC





Active Scripts


Active


Reported


Ezetimibe 10 Mg Tablet 1 Tab PO DAILY


Venlafaxine Hcl Er (Venlafaxine Hcl) 150 Mg Cap.er.24h 1 Cap PO DAILY


Neomycin-Polymyxin-Hc Ear Susp (Neomycin/Polymyxin B Sulf/Hc) 10 Ml Drops.susp 

2-3 Drop AS TID


Mupirocin Ointment (Mupirocin) 22 Gm Oint...g. 1 [place] TP TID


Cozaar ** (Losartan Potassium) 50 Mg Tablet 100 Mg PO DAILY


Toprol Xl (Metoprolol Succinate) 50 Mg Tab.er.24h 25 Mg PO DAILY


Super B Complex (Vitamin B Complex & Vit C No.4) 150 Mg Tablet 150 Mg PO DAILY


Calcium + D Soft Chewable Tab (Ca Carbonate/Vitamin D3/Vit K) 1 Each Tab.chew 2 

Each PO DAILY


Januvia (Sitagliptin Phosphate) 100 Mg Tablet 1 Tab PO DAILY


Bupropion Xl (Bupropion Hcl) 300 Mg Tab.er.24h 1 Tab PO DAILY


Aspir-Low (Aspirin) 81 Mg Tablet.dr 1 Tab PO DAILY


Mirapex (Pramipexole Di-Hcl) 0.25 Mg Tablet 1 Tab PO QHS


Hydrocodone-Apap 7.5-325  ** (Hydrocodone Bit/Acetaminophen) 1 Each Tablet 1 Tab

PO PRN Q6HRS PRN


Trazodone Hcl 50 Mg Tablet 50 Mg PO DAILY


Glipizide 5 Mg Tablet 5 Mg PO DAILY


Cymbalta (Duloxetine Hcl) 60 Mg Capsule.dr 1 Cap PO BID


Metformin Hcl 1,000 Mg Tablet 1,000 Mg PO DAILYWBKFT


Levothyroxine Sodium 88 Mcg Tablet 1 Tab PO DAILY


Vitals/I & O





Vital Sign - Last 24 Hours








 1/2/21 1/2/21 1/2/21 1/3/21





 19:00 20:00 23:00 03:00


 


Temp 95.6  95.5 96.5





 95.6  95.5 96.5


 


Pulse 87  91 69


 


Resp 20  19 14


 


B/P (MAP) 166/77 (106)  167/76 (106) 145/72 (96)


 


Pulse Ox 90  97 96


 


O2 Delivery Room Air Room Air Room Air Room Air


 


    





    





 1/3/21 1/3/21 1/3/21 1/3/21





 07:00 08:00 09:47 11:00


 


Temp 97.5   96.0





 97.5   96.0


 


Pulse 79  79 84


 


Resp 18   18


 


B/P (MAP) 152/82 (105)  152/82 164/78 (106)


 


Pulse Ox 94   94


 


O2 Delivery Room Air Room Air  Room Air


 


    





    





 1/3/21   





 15:00   


 


Temp 96.6   





 96.6   


 


Pulse 104   


 


Resp 18   


 


B/P (MAP) 147/89 (108)   


 


Pulse Ox 96   


 


O2 Delivery Room Air   














Intake and Output   


 


 1/2/21 1/2/21 1/3/21





 15:00 23:00 07:00


 


Intake Total  100 ml 200 ml


 


Balance  100 ml 200 ml











Justifications for Admission


Other Justification


Palpitations











KALLIE VILLARREAL MD             Joon 3, 2021 16:00

## 2021-01-03 NOTE — PDOC
[facility] PROGRESS NOTE


Date of Service


DOS:


DATE: 1/3/21 


TIME: 10:45





Chief Complaint


Chief Complaint


Acute respiratory distress and palpitations concerning for non-STEMI


COVID-19 negative


Subclinical hypothyroidism


Elevated D-dimer


Lactic acidosis





Admit to medicine for further management


Cardiology consult for palpitations and tachycardia


Pending echocardiogram


Titrate O2 supplementation to maintain O2 saturation greater than 92%


Continue telemetry monitoring


Lovenox for DVT prophylaxis


Protonix GI prophylaxis


ADA diet


Full code


Discussed with RN and SW


Disposition pending cardiac evaluation


Surrogate decision maker is the  or son





History of Present Illness


History of Present Illness


1/3/2021


No acute events overnight.  No telemetry monitoring events.  Heart rate in the 

90s to 60s.  Asymptomatic but restless.  Does take pramipexole at home for 

restless leg syndrome.  Saturating 96% on room air.  Pending echocardiogram.  

Patient's chart, labs, images were reviewed and discussed with RN





80-year-old female presents with report of shortness of breath that started at 

1400 today while organizing things in her house.  Patient denies any fever or 

chills.  Denies cough.  Denies nasal congestion.  Denies known sick contacts.  

Denies known exposure to COVID-19.  Patient does report some palpitations.  

Patient does report history of partial thyroidectomy.  Reports has been 

compliant with taking her thyroid medication.





Vitals/I&O


Vitals/I&O:





                                   Vital Signs








  Date Time  Temp Pulse Resp B/P (MAP) Pulse Ox O2 Delivery O2 Flow Rate FiO2


 


1/3/21 09:47  80  152/82    


 


1/3/21 07:00 97.5  18  94 Room Air  





 97.5       














                                    I & O   


 


 1/2/21 1/2/21 1/3/21





 15:00 23:00 07:00


 


Intake Total  100 ml 200 ml


 


Balance  100 ml 200 ml











Physical Exam


General:  mild distress


Heart:  Other (Regular rhythm with a rate of 100)


Lungs:  Other


Abdomen:  Normal bowel sounds





Labs


Labs:





Laboratory Tests








Test


 1/2/21


11:25 1/2/21


11:45 1/2/21


16:49 1/2/21


19:59


 


Troponin I Quantitative


 < 0.017 ng/mL


(0.000-0.055) 


 


 





 


Glucose (Fingerstick)


 


 145 mg/dL


(70-99) 156 mg/dL


(70-99) 204 mg/dL


(70-99)


 


Test


 1/3/21


04:00 1/3/21


07:54 


 





 


White Blood Count


 6.6 x10^3/uL


(4.0-11.0) 


 


 





 


Red Blood Count


 4.02 x10^6/uL


(3.50-5.40) 


 


 





 


Hemoglobin


 13.2 g/dL


(12.0-15.5) 


 


 





 


Hematocrit


 39.0 %


(36.0-47.0) 


 


 





 


Mean Corpuscular Volume 97 fL ()    


 


Mean Corpuscular Hemoglobin 33 pg (25-35)    


 


Mean Corpuscular Hemoglobin


Concent 34 g/dL


(31-37) 


 


 





 


Red Cell Distribution Width


 13.2 %


(11.5-14.5) 


 


 





 


Platelet Count


 230 x10^3/uL


(140-400) 


 


 





 


Neutrophils (%) (Auto) 59 % (31-73)    


 


Lymphocytes (%) (Auto) 25 % (24-48)    


 


Monocytes (%) (Auto) 10 % (0-9)    


 


Eosinophils (%) (Auto) 6 % (0-3)    


 


Basophils (%) (Auto) 1 % (0-3)    


 


Neutrophils # (Auto)


 3.9 x10^3/uL


(1.8-7.7) 


 


 





 


Lymphocytes # (Auto)


 1.7 x10^3/uL


(1.0-4.8) 


 


 





 


Monocytes # (Auto)


 0.6 x10^3/uL


(0.0-1.1) 


 


 





 


Eosinophils # (Auto)


 0.4 x10^3/uL


(0.0-0.7) 


 


 





 


Basophils # (Auto)


 0.0 x10^3/uL


(0.0-0.2) 


 


 





 


Sodium Level


 142 mmol/L


(136-145) 


 


 





 


Potassium Level


 4.2 mmol/L


(3.5-5.1) 


 


 





 


Chloride Level


 105 mmol/L


() 


 


 





 


Carbon Dioxide Level


 27 mmol/L


(21-32) 


 


 





 


Anion Gap 10 (6-14)    


 


Blood Urea Nitrogen


 17 mg/dL


(7-20) 


 


 





 


Creatinine


 1.3 mg/dL


(0.6-1.0) 


 


 





 


Estimated GFR


(Cockcroft-Gault) 39.5 


 


 


 





 


Glucose Level


 156 mg/dL


(70-99) 


 


 





 


Calcium Level


 9.5 mg/dL


(8.5-10.1) 


 


 





 


Phosphorus Level


 3.3 mg/dL


(2.6-4.7) 


 


 





 


Magnesium Level


 2.0 mg/dL


(1.8-2.4) 


 


 





 


Glucose (Fingerstick)


 


 168 mg/dL


(70-99) 


 














Assessment and Plan


Assessmemt and Plan


Problems


Medical Problems:


(1) Lactic acidosis


Status: Acute  





(2) Shortness of breath


Status: Acute  





(3) Sinus tachycardia


Status: Acute  





(4) Suspected 2019 novel coronavirus infection


Status: Acute  











Comment


Review of Relevant


I have reviewed the following items mahendra (where applicable) has been applied.


Medications:





Current Medications








 Medications


  (Trade)  Dose


 Ordered  Sig/Yvonne


 Route


 PRN Reason  Start Time


 Stop Time Status Last Admin


Dose Admin


 


 Aspirin


  (Ecotrin)  81 mg  DAILY


 PO


   1/2/21 13:00


    1/3/21 09:47





 


 EZETIMIBE


  (Zetia)  10 mg  DAILY


 PO


   1/2/21 13:00


    1/3/21 09:47





 


 Levothyroxine


 Sodium


  (Synthroid)  88 mcg  DAILY06


 PO


   1/2/21 12:30


    1/3/21 07:37





 


 Pramipexole


 Dihydrochloride


  (miraPEX)  0.75 mg  QHS


 PO


   1/2/21 21:00


    1/2/21 21:49





 


 Trazodone HCl


  (Desyrel)  50 mg  QHS


 PO


   1/2/21 21:00


    1/2/21 21:42





 


 Bupropion HCl


  (Wellbutrin Xl)  300 mg  DAILY


 PO


   1/2/21 13:00


    1/3/21 09:47





 


 Calcium/Vitamin D


  (Oscal D 500mg/


 200uts)  1 tab  BIDWMEALS


 PO


   1/2/21 17:00


    1/3/21 09:47





 


 Metoprolol


 Succinate


  (Toprol Xl)  25 mg  DAILY


 PO


   1/2/21 13:00


    1/3/21 09:47





 


 Venlafaxine HCl


  (Effexor)  50 mg  TID


 PO


   1/2/21 14:00


    1/3/21 09:47





 


 Vitamin B Complex


  (Christopher B)  1 tab  DAILY


 PO


   1/2/21 13:00


    1/3/21 09:47





 


 Insulin Human


 Lispro


  (HumaLOG)  0-7 UNITS  TIDWMEALS


 SQ


   1/2/21 17:00


    1/3/21 09:49





 


 Enoxaparin Sodium


  (Lovenox 40mg


 Syringe)  40 mg  Q24H


 SQ


   1/2/21 13:00


    1/2/21 13:12














Justifications for Admission


Other Justification


Palpitations











CAMILLA MERCADO MD                     Joon 3, 2021 10:47

## 2021-01-03 NOTE — NUR
Pt had nose bleed. Said she had one last night also. Bleeding stopped quickly with pressure. 
Spoke to Dr Leung and received orders to stop the lovenox.

## 2021-01-04 VITALS — SYSTOLIC BLOOD PRESSURE: 124 MMHG | DIASTOLIC BLOOD PRESSURE: 60 MMHG

## 2021-01-04 VITALS — DIASTOLIC BLOOD PRESSURE: 66 MMHG | SYSTOLIC BLOOD PRESSURE: 155 MMHG

## 2021-01-04 VITALS
DIASTOLIC BLOOD PRESSURE: 68 MMHG | SYSTOLIC BLOOD PRESSURE: 147 MMHG | SYSTOLIC BLOOD PRESSURE: 147 MMHG | DIASTOLIC BLOOD PRESSURE: 68 MMHG | SYSTOLIC BLOOD PRESSURE: 147 MMHG | DIASTOLIC BLOOD PRESSURE: 68 MMHG

## 2021-01-04 VITALS — SYSTOLIC BLOOD PRESSURE: 134 MMHG | DIASTOLIC BLOOD PRESSURE: 67 MMHG

## 2021-01-04 RX ADMIN — CALCIUM CARBONATE-VITAMIN D TAB 500 MG-200 UNIT SCH TAB: 500-200 TAB at 08:34

## 2021-01-04 RX ADMIN — EZETIMIBE SCH MG: 10 TABLET ORAL at 08:37

## 2021-01-04 RX ADMIN — INSULIN LISPRO SCH UNITS: 100 INJECTION, SOLUTION INTRAVENOUS; SUBCUTANEOUS at 11:23

## 2021-01-04 RX ADMIN — ASPIRIN SCH MG: 81 TABLET, COATED ORAL at 08:36

## 2021-01-04 RX ADMIN — VENLAFAXINE SCH MG: 50 TABLET ORAL at 13:55

## 2021-01-04 RX ADMIN — VENLAFAXINE SCH MG: 50 TABLET ORAL at 08:36

## 2021-01-04 RX ADMIN — BUPROPION HYDROCHLORIDE SCH MG: 150 TABLET, FILM COATED, EXTENDED RELEASE ORAL at 08:36

## 2021-01-04 RX ADMIN — Medication SCH TAB: at 08:36

## 2021-01-04 RX ADMIN — LEVOTHYROXINE SODIUM SCH MCG: 88 TABLET ORAL at 05:40

## 2021-01-04 RX ADMIN — METOPROLOL SUCCINATE SCH MG: 25 TABLET, EXTENDED RELEASE ORAL at 08:37

## 2021-01-04 RX ADMIN — INSULIN LISPRO SCH UNITS: 100 INJECTION, SOLUTION INTRAVENOUS; SUBCUTANEOUS at 08:36

## 2021-01-04 NOTE — NUR
SW following for discharge planning. Spoke with RN and reviewed chart. Pt to discharge home 
today, self-care. Pt on room air and oral medications. No further SW needs at this time.

## 2021-01-04 NOTE — PDOC
[facility] PROGRESS NOTE


Date of Service


DOS:


DATE: 1/4/21 


TIME: 13:16





Chief Complaint


Chief Complaint


A/P:


Acute diastolic CHF


Acute respiratory distress and palpitations 


COVID-19 negative


Subclinical hypothyroidism


Elevated D-dimer


Lactic acidosis


Chronic kidney disease. 


Hyperlipidemia. 


Obesity.





Admit to medicine for further management


Cardiology consult for palpitations and tachycardia


Pending echocardiogram


Titrate O2 supplementation to maintain O2 saturation greater than 92%


Continue telemetry monitoring


Lovenox for DVT prophylaxis


Protonix GI prophylaxis


ADA diet


Full code


Discussed with RN and SW


Disposition pending cardiac evaluation


Surrogate decision maker is the  or son





History of Present Illness


History of Present Illness


Ms Resendez is a 80-year-old female w/ PMHx CAD s/p PCI to RCA 2018, 

Hypothyroidism, HTN, Depression, HLD, RLS, breast cancer in remission who 

presents with report of shortness of breath that started at 1400 on 1/2/2021 

while organizing things in her house.  Patient denies any fever or chills.  

Denies cough.  Denies nasal congestion.  Denies known sick contacts.  Denies 

known exposure to COVID-19.  Patient does report some palpitations.  Patient 

does report history of partial thyroidectomy.  Reports has been compliant with 

taking her thyroid medication and cardiac meds. CTPA negative for PE. Troponin 

x3 negative.


Admitted with CHF exacerbation


1/3: No acute events overnight.  No telemetry monitoring events.  Heart rate in 

the 90s to 60s.  Asymptomatic but restless.  Does take pramipexole at home for 

restless leg syndrome.  Saturating 96% on room air. 





Afebrile.  Shortness of breath improved.  No chest pain.  Awaiting 

echocardiogram.  COVID-19 returned negative.





Plan:


Home with outpatient cardiology f/u. Could be anginal equivalent





Vitals/I&O


Vitals/I&O:





                                   Vital Signs








  Date Time  Temp Pulse Resp B/P (MAP) Pulse Ox O2 Delivery O2 Flow Rate FiO2


 


1/4/21 11:59 97.5 92 20 134/67 (89) 94 Room Air  





 97.5       














                                    I & O0   


 


 1/3/21 1/3/21 1/4/21





 15:00 23:00 07:00


 


Intake Total 450 ml 300 ml 


 


Balance 450 ml 300 ml 











Physical Exam


General:  mild distress


Heart:  Regular rate


Lungs:  Other


Abdomen:  Normal bowel sounds





Labs


Labs:





Laboratory Tests








Test


 1/3/21


16:36 1/3/21


20:03 1/4/21


07:30 1/4/21


10:58


 


Glucose (Fingerstick)


 161 mg/dL


(70-99) 191 mg/dL


(70-99) 165 mg/dL


(70-99) 138 mg/dL


(70-99)











Assessment and Plan


Assessmemt and Plan


Problems


Medical Problems:


(1) Lactic acidosis


Status: Acute  





(2) Shortness of breath


Status: Acute  





(3) Sinus tachycardia


Status: Acute  





(4) Suspected 2019 novel coronavirus infection


Status: Acute  











Comment


Review of Relevant


I have reviewed the following items mahendra (where applicable) has been applied.





Justifications for Admission


Other Justification


Palpitations











HENRY KELLEY MD         Jan 4, 2021 13:16

## 2021-01-04 NOTE — CARD
MR#: [id number]

Account#: [id number]

Accession#: [id number]

Date of Study: 01/04/2021

Ordering Physician: ARIAN MENDES, 

Referring Physician: ARIAN MENDES, 

Tech: Jailene Hernandez [facility]





--------------- APPROVED REPORT --------------





EXAM: Two-dimensional and M-mode echocardiogram with Doppler and color Doppler.



Other Information 

Quality : Technically LimitedHR: 87bpm

Rhythm : NSR



INDICATION

Dyspnea 



RISK FACTORS

Hypertension 

Obesity   

Hyperlipidemia



2D DIMENSIONS 

Left Atrium(2D)4.2 (1.6-4.0cm)IVSd0.9 (0.7-1.1cm)

Aortic Root(2D)2.4 (2.0-3.7cm)LVDd3.6 (3.9-5.9cm)

LVOT Diameter2.1 (1.8-2.4cm)PWd1.0 (0.7-1.1cm)

LVDs2.4 (2.5-4.0cm)FS (%) 33.5 %

SV35.1 mlLVEF(%)63.1 (>50%)



Aortic Valve

AoV Peak Aureliano.118.0cm/sAoV VTI22.2cm

AO Peak GR.5.6mmHgLVOT Peak Aureliano.73.4cm/s

AO Mean GR.3mmHgAVA (VMAX)2.07cm2



Pulmonary Vein

S1 Lcubkcip23.5cm/sD2 Hqzyldmm29.7cm/s

PVa [id number]



 LEFT VENTRICLE 

The left ventricle is normal size. There is normal left ventricular wall thickness. The left ventricu
lar systolic function is normal and the ejection fraction is within normal range. The ejection fracti
on is 55 to 60% There is normal LV segmental wall motion. Unable to determine diastolic fx.



 RIGHT VENTRICLE 

The right ventricle is normal size. There is normal right ventricular wall thickness. The right ventr
icular systolic function is normal.



 ATRIA 

The left atrium size is normal. The right atrium size is normal. The interatrial septum is intact wit
h no evidence for an atrial septal defect or patent foramen ovale as noted on 2-D or Doppler imaging.




 AORTIC VALVE 

The aortic valve is normal in structure and function. Doppler and Color Flow revealed no significant 
aortic regurgitation. There is no significant aortic valvular stenosis.



 MITRAL VALVE 

The mitral valve is normal in structure and function. There is no evidence of mitral valve prolapse. 
There is no mitral valve stenosis. Doppler and Color Flow revealed mild mitral regurgitation.



 TRICUSPID VALVE 

The tricuspid valve is normal in structure and function. Doppler and Color Flow revealed no tricuspid
 valve regurgitation noted.



 PULMONIC VALVE 

The pulmonary valve is normal in structure and function. Doppler and Color Flow revealed no pulmonic 
valvular regurgitation.



 GREAT VESSELS 

The aortic root is normal in size. The ascending aorta is normal in size. Not visualized.



 PERICARDIAL EFFUSION 

There is no evidence of significant pericardial effusion.



Critical Notification

Critical Value: No



<Conclusion>

The left ventricle is normal size.

The left ventricular systolic function is normal and the ejection fraction is within normal range.

The ejection fraction is 55 to 60%

Doppler and Color Flow revealed no significant aortic regurgitation.

There is no significant aortic valvular stenosis.

Doppler and Color Flow revealed mild mitral regurgitation.

Doppler and Color Flow revealed no tricuspid valve regurgitation noted.



Signed by : Arian Mendes MD

Electronically Approved : 01/04/2021 16:53:01

## 2021-01-04 NOTE — PDOC3
Discharge Summary


Visit Information


Date of Admission:  Jan 2, 2021


Date of Discharge:  Jan 4, 2021


Admitting Diagnosis:  Shortness of breath


Final Diagnosis


Problems


Medical Problems:


(1) Lactic acidosis


Status: Acute  





(2) Shortness of breath


Status: Acute  





(3) Sinus tachycardia


Status: Acute  





(4) Suspected 2019 novel coronavirus infection


Status: Acute  











Brief Hospital Course


Allergies





                                    Allergies








Coded Allergies Type Severity Reaction Last Updated Verified


 


  ACE Inhibitors Allergy Intermediate  12/23/18 Yes


 


  amlodipine Allergy Intermediate  12/23/18 Yes


 


  rosuvastatin Allergy Intermediate  12/23/18 Yes








Vital Signs





Vital Signs








  Date Time  Temp Pulse Resp B/P (MAP) Pulse Ox O2 Delivery O2 Flow Rate FiO2


 


1/4/21 11:59 97.5 92 20 134/67 (89) 94 Room Air  





 97.5       








Lab Results





Laboratory Tests








Test


 1/2/21


16:49 1/2/21


19:59 1/3/21


04:00 1/3/21


07:54


 


Glucose (Fingerstick)


 156 mg/dL


(70-99) 204 mg/dL


(70-99) 


 168 mg/dL


(70-99)


 


White Blood Count


 


 


 6.6 x10^3/uL


(4.0-11.0) 





 


Red Blood Count


 


 


 4.02 x10^6/uL


(3.50-5.40) 





 


Hemoglobin


 


 


 13.2 g/dL


(12.0-15.5) 





 


Hematocrit


 


 


 39.0 %


(36.0-47.0) 





 


Mean Corpuscular Volume   97 fL ()  


 


Mean Corpuscular Hemoglobin   33 pg (25-35)  


 


Mean Corpuscular Hemoglobin


Concent 


 


 34 g/dL


(31-37) 





 


Red Cell Distribution Width


 


 


 13.2 %


(11.5-14.5) 





 


Platelet Count


 


 


 230 x10^3/uL


(140-400) 





 


Neutrophils (%) (Auto)   59 % (31-73)  


 


Lymphocytes (%) (Auto)   25 % (24-48)  


 


Monocytes (%) (Auto)   10 % (0-9)  


 


Eosinophils (%) (Auto)   6 % (0-3)  


 


Basophils (%) (Auto)   1 % (0-3)  


 


Neutrophils # (Auto)


 


 


 3.9 x10^3/uL


(1.8-7.7) 





 


Lymphocytes # (Auto)


 


 


 1.7 x10^3/uL


(1.0-4.8) 





 


Monocytes # (Auto)


 


 


 0.6 x10^3/uL


(0.0-1.1) 





 


Eosinophils # (Auto)


 


 


 0.4 x10^3/uL


(0.0-0.7) 





 


Basophils # (Auto)


 


 


 0.0 x10^3/uL


(0.0-0.2) 





 


Sodium Level


 


 


 142 mmol/L


(136-145) 





 


Potassium Level


 


 


 4.2 mmol/L


(3.5-5.1) 





 


Chloride Level


 


 


 105 mmol/L


() 





 


Carbon Dioxide Level


 


 


 27 mmol/L


(21-32) 





 


Anion Gap   10 (6-14)  


 


Blood Urea Nitrogen


 


 


 17 mg/dL


(7-20) 





 


Creatinine


 


 


 1.3 mg/dL


(0.6-1.0) 





 


Estimated GFR


(Cockcroft-Gault) 


 


 39.5 


 





 


Glucose Level


 


 


 156 mg/dL


(70-99) 





 


Calcium Level


 


 


 9.5 mg/dL


(8.5-10.1) 





 


Phosphorus Level


 


 


 3.3 mg/dL


(2.6-4.7) 





 


Magnesium Level


 


 


 2.0 mg/dL


(1.8-2.4) 





 


Test


 1/3/21


11:23 1/3/21


16:36 1/3/21


20:03 1/4/21


07:30


 


Glucose (Fingerstick)


 175 mg/dL


(70-99) 161 mg/dL


(70-99) 191 mg/dL


(70-99) 165 mg/dL


(70-99)


 


Test


 1/4/21


10:58 


 


 





 


Glucose (Fingerstick)


 138 mg/dL


(70-99) 


 


 











Laboratory Tests








Test


 1/3/21


16:36 1/3/21


20:03 1/4/21


07:30 1/4/21


10:58


 


Glucose (Fingerstick)


 161 mg/dL


(70-99) 191 mg/dL


(70-99) 165 mg/dL


(70-99) 138 mg/dL


(70-99)








Brief Hospital Course


Ms Resendez is a 80-year-old female w/ PMHx CAD s/p PCI to RCA 2018, Hypo

thyroidism, HTN, Depression, HLD, RLS, breast cancer in remission who presents 

with report of shortness of breath that started at 1400 on 1/2/2021 while 

organizing things in her house.  Patient denies any fever or chills.  Denies 

cough.  Denies nasal congestion.  Denies known sick contacts.  Denies known 

exposure to COVID-19.  Patient does report some palpitations.  Patient does re

port history of partial thyroidectomy.  Reports has been compliant with taking 

her thyroid medication and cardiac meds. CTPA negative for PE. Troponin x3 

negative.


Admitted with CHF exacerbation


1/3: No acute events overnight.  No telemetry monitoring events.  Heart rate in 

the 90s to 60s.  Asymptomatic but restless.  Does take pramipexole at home for 

restless leg syndrome.  Saturating 96% on room air. 





Afebrile.  Shortness of breath improved.  No chest pain.  Awaiting 

echocardiogram.  COVID-19 returned negative.





Consults: Cardiology





Problem list:


Acute diastolic CHF


Acute respiratory distress and palpitations 


COVID-19 negative


Subclinical hypothyroidism


Elevated D-dimer


Lactic acidosis


Chronic kidney disease. 


Hyperlipidemia. 


Obesity.





Admit to medicine for further management


Cardiology consult for palpitations and tachycardia


Pending echocardiogram


Titrate O2 supplementation to maintain O2 saturation greater than 92%


Continue telemetry monitoring


Lovenox for DVT prophylaxis


Protonix GI prophylaxis


ADA diet


Full code


Discussed with RN and SW


Disposition pending cardiac evaluation


Surrogate decision maker is the  or son





Plan:


Home with outpatient cardiology f/u. Could be anginal equivalent





Greater than 30 minutes spent on d/c home with self care.





Discharge Information


Condition at Discharge:  Improved


Follow Up:  Weeks (1)


Disposition/Orders:  D/C to Home


Scheduled


Aspirin (Aspir-Low) 81 Mg Tablet.dr, 1 TAB PO DAILY, #30 Ref 3 (Reported)


   Entered as Reported by: STEPHANIE AGUDELO on 8/9/18 0822


   Last Action: Continued on 1/2/21 1205 by CAMILLA MERCADO MD


Bupropion Hcl (Bupropion Xl) 300 Mg Tab.er.24h, 1 TAB PO DAILY, #30 Ref 2 

(Reported)


   Entered as Reported by: STEPHANIE AGUDELO on 8/9/18 0822


   Last Action: Converted on 1/2/21 1205 by CAMILLA MERCADO MD


Ca Carbonate/Vitamin D3/Vit K (Calcium + D Soft Chewable Tab) 1 Each Tab.chew, 2

EACH PO DAILY, (Reported)


   Entered as Reported by: STEPHANIE AGUDELO on 8/9/18 0822


   Last Action: Converted on 1/2/21 1205 by CAMILLA MERCADO MD


Duloxetine Hcl (Cymbalta) 60 Mg Capsule.dr, 1 CAP PO BID, #90 Ref 3 (Reported)


   Entered as Reported by: STEPHANIE AGUDELO on 8/9/18 0822


   Last Action: Converted on 1/2/21 1205 by CAMILLA MERCADO MD


Ezetimibe (Ezetimibe) 10 Mg Tablet, 1 TAB PO DAILY for CHOLESTEROL, (Reported)


   Entered as Reported by: LAKSHMI SANDRA on 1/2/21 0819


   Last Taken: Unknown Dose on Unknown Date & Time     Last Action: Continued on

1/2/21 1205 by CAMILLA MERCADO MD


Glipizide (Glipizide) 5 Mg Tablet, 5 MG PO DAILY, (Reported)


   Entered as Reported by: STEPHANIE AGUDELO on 8/9/18 0822


Levothyroxine Sodium (Levothyroxine Sodium) 88 Mcg Tablet, 1 TAB PO DAILY, #30 

Ref 5 (Reported)


   Entered as Reported by: STEPHANIE AGUDELO on 8/9/18 0822


   Last Action: Continued on 1/2/21 1205 by CAMILLA MERCADO MD


Losartan Potassium (Cozaar **) 50 Mg Tablet, 100 MG PO DAILY for HYPERTENSION, 

(Reported)


   Entered as Reported by: RACHEL BISWAS on 12/26/18 1129


   Last Action: HELD on 1/2/21 1205 by CAMILLA MERCADO MD


Metformin Hcl (Metformin Hcl) 1,000 Mg Tablet, 1,000 MG PO DAILYWBKFT for ANTI-

DIABETIC, Ref 0 (Reported)


   Entered as Reported by: STEPHANIE AGUDELO on 8/9/18 0822


Metoprolol Succinate (Toprol Xl) 50 Mg Tab.er.24h, 25 MG PO DAILY for FOR 

HYPERTENSION, #30 Ref 0 (Reported)


   Entered as Reported by: RACHEL BISWAS on 12/26/18 1129


   Last Action: Converted on 1/2/21 1205 by CAMILLA MERCADO MD


Mupirocin (Mupirocin Ointment) 22 Gm Oint...g., 1 CARROLL TP TID for INFECTION, 

(Reported)


   Entered as Reported by: LAKSHMI SANDRA on 1/2/21 0819


   Last Taken: Unknown Dose on Unknown Date & Time     Last Action: New Order on

1/2/21 0819 by LAKSHMI SANDRA


Neomycin/Polymyxin B Sulf/Hc (Neomycin-Polymyxin-Hc Ear Susp) 10 Ml Drops.susp, 

2-3 DROP AS TID for EAR INFECTION, (Reported)


   Entered as Reported by: LAKSHMI SANDRA on 1/2/21 0819


   Last Taken: Unknown Dose on Unknown Date & Time     Last Action: New Order on

1/2/21 0819 by LAKSHMI SANDRA


Pramipexole Di-Hcl (Mirapex) 0.25 Mg Tablet, 1 TAB PO QHS, #90 Ref 1 (Reported)


   Entered as Reported by: STEPHANIE AGUDELO on 8/9/18 0822


   Last Action: Continued on 1/2/21 1205 by CAMILLA MERCADO MD


Sitagliptin Phosphate (Januvia) 100 Mg Tablet, 1 TAB PO DAILY, #30 Ref 5 

(Reported)


   Entered as Reported by: STEPHANIE AGUDELO on 8/9/18 0822


   Last Action: HELD on 1/2/21 1205 by CAMILLA MERCADO MD


Trazodone Hcl (Trazodone Hcl) 50 Mg Tablet, 50 MG PO DAILY, (Reported)


   Entered as Reported by: STEPHANIE AGUDELO on 8/9/18 0822


   Last Action: Continued on 1/2/21 1205 by CAMILLA MERCADO MD


Venlafaxine Hcl (Venlafaxine Hcl Er) 150 Mg Cap.er.24h, 1 CAP PO DAILY for 

DEPRESSION, (Reported)


   Entered as Reported by: LAKSHMI SANDRA on 1/2/21 0819


   Last Taken: Unknown Dose on Unknown Date & Time     Last Action: Converted on

1/2/21 1205 by CAMILLA MERCADO MD


Vitamin B Complex & Vit C No.4 (Super B Complex) 150 Mg Tablet, 150 MG PO DAILY,

(Reported)


   Entered as Reported by: STEPHANIE AGUDELO on 8/9/18 0822


   Last Action: Converted on 1/2/21 1205 by CAMILLA MERCADO MD





Scheduled PRN


Hydrocodone Bit/Acetaminophen (Hydrocodone-Apap 7.5-325  **) 1 Each Tablet, 1 

TAB PO PRN Q6HRS PRN for PAIN, Ref 0 (Reported)


   Entered as Reported by: STEPHANIE AGUDELO on 8/9/18 0822





Justicifation of Admission Dx:


Justifications for Admission:


Justification of Admission Dx:  Yes


Respiratory Failure:  Severe Resp Distress











HENRY KELLEY MD         Jan 4, 2021 15:22

## 2021-01-04 NOTE — NUR
Pt left unit at approx 1645 by wheelchair via private vehicle with spouse. VSS. Discharge 
paperwork and follow-up discussed with pt, further questions addressed.

## 2021-01-05 NOTE — PDOC
Provider Note


Date of Service:


DATE: 1/5/21 


TIME: 06:51


Provider Note


Late entry for 1/4/21





S: 


No acute events overnight. Patient feels well and is ready to go home. 





O: 


VSS


Clr lungs. 


No edema. 


Normal heart tones. 





Labs reviewed. 


Imaging reviewed. - echo with normal LV function. 


CT reviewed.





Impression: 


1. Mild acute on chronic diastolic HF without active ischemia with known prior 

RCA PCI. 


2. HTN


3. Covid ruled out.





Recs: 


1. Ok to DC home and will f/u in the office for routine CAD mgmt.





Justifications for Admission


Other Justification


Palpitations











SILVER BETANCUR MD        Jan 5, 2021 06:55

## 2021-08-11 ENCOUNTER — HOSPITAL ENCOUNTER (OUTPATIENT)
Dept: HOSPITAL 61 - ECHO | Age: 81
End: 2021-08-11
Attending: INTERNAL MEDICINE
Payer: MEDICARE

## 2021-08-11 DIAGNOSIS — I08.8: Primary | ICD-10-CM

## 2021-08-11 DIAGNOSIS — I25.10: ICD-10-CM

## 2021-08-11 PROCEDURE — 93306 TTE W/DOPPLER COMPLETE: CPT

## 2021-08-11 NOTE — CARD
MR#: [id number]

Account#: [id number]

Accession#: [id number]

Date of Study: 08/11/2021

Ordering Physician: SILVER BETANCUR, 

Referring Physician: SILVER BETANCUR, 

Tech: Jailene Hernandez [facility]





--------------- APPROVED REPORT --------------





EXAM: Two-dimensional and M-mode echocardiogram with Doppler and color Doppler.



Other Information 

Quality : AverageHR: 84bpm

Rhythm : NSR



INDICATION

Cardiac Disease: 



RISK FACTORS

Hypertension 

Obesity   

Hyperlipidemia

Diabetes



2D DIMENSIONS 

RVDd3.0 (2.9-3.5cm)Left Atrium(2D)4.3 (1.6-4.0cm)

IVSd1.2 (0.7-1.1cm)Aortic Root(2D)3.0 (2.0-3.7cm)

LVDd3.9 (3.9-5.9cm)LVOT Diameter2.1 (1.8-2.4cm)

PWd1.0 (0.7-1.1cm)LVDs2.5 (2.5-4.0cm)

FS (%) 36.2 %SV43.2 ml

LVEF(%)66.5 (>50%)



Aortic Valve

AoV Peak Aureliano.127.4cm/sAoV VTI26.2cm

AO Peak GR.6.5mmHgLVOT Peak Aureliano.76.4cm/s

AO Mean GR.3mmHgAVA (VMAX)2.03cm2



Pulmonary Valve

PV Peak Opgtfcbd287.0cm/s



 LEFT VENTRICLE 

The left ventricle is normal size. There is borderline to mild concentric left ventricular hypertroph
y. The left ventricular systolic function is normal.  Estimated ejection fraction 55-60%. There is no
rmal LV segmental wall motion. The left ventricular diastolic function and filling is normal for age.




 RIGHT VENTRICLE 

The right ventricle is normal size. There is normal right ventricular wall thickness. The right ventr
icular systolic function is normal.



 ATRIA 

The left atrium size is normal. The right atrium size is normal. The interatrial septum is intact wit
h no evidence for an atrial septal defect or patent foramen ovale as noted on 2-D or Doppler imaging.




 AORTIC VALVE 

The aortic valve is normal in structure and function. Doppler and Color Flow revealed no significant 
aortic regurgitation. There is no significant aortic valvular stenosis.



 MITRAL VALVE 

The mitral valve is normal in structure and function. There is no evidence of mitral valve prolapse. 
There is no mitral valve stenosis. Doppler and Color-flow revealed trace to mild mitral regurgitation
.



 TRICUSPID VALVE 

The tricuspid valve is normal in structure and function. Doppler and Color Flow revealed no tricuspid
 valve regurgitation noted. There is no tricuspid valve stenosis.



 PULMONIC VALVE 

The pulmonary valve is normal in structure and function. Doppler and Color Flow revealed mild pulmoni
c valvular regurgitation.



 GREAT VESSELS 

The aortic root is normal in size. The ascending aorta is normal in size. The IVC is normal in size a
nd collapses >50% with inspiration.



 PERICARDIAL EFFUSION 

There is no evidence of significant pericardial effusion.



Critical Notification

Critical Value: No



<Conclusion>

The left ventricular systolic function is normal.  

Estimated ejection fraction 55-60%.

There is normal LV segmental wall motion.

Trace to mild mitral regurgitation.

There is no evidence of significant pericardial effusion.



Signed by : Evangelist Smallwood, 

Electronically Approved : 08/11/2021 17:01:30

## 2021-10-27 ENCOUNTER — HOSPITAL ENCOUNTER (OUTPATIENT)
Dept: HOSPITAL 61 - KCIC MRI | Age: 81
End: 2021-10-27
Attending: FAMILY MEDICINE
Payer: MEDICARE

## 2021-10-27 DIAGNOSIS — I73.9: ICD-10-CM

## 2021-10-27 DIAGNOSIS — R44.1: ICD-10-CM

## 2021-10-27 DIAGNOSIS — C50.912: Primary | ICD-10-CM

## 2021-10-27 DIAGNOSIS — R51.9: ICD-10-CM

## 2021-10-27 PROCEDURE — 70551 MRI BRAIN STEM W/O DYE: CPT

## 2021-10-27 NOTE — KCIC
EXAM: Brain MRI without contrast.



HISTORY: Headaches.



TECHNIQUE: Multiplanar, multisequence magnetic resonance imaging of the brain was performed without c
ontrast.



COMPARISON: None.



FINDINGS: There is no restricted diffusion to suggest acute or subacute infarction. There is no susce
ptibility effect to suggest hemorrhage. There is no mass effect or midline shift. There is no hydroce
phalus.



There are scattered areas of signal change within the cerebral white matter, likely due to chronic sm
all vessel disease. There is cerebral volume loss.



There is evidence of lens surgery. The paranasal sinuses are clear. There is minimal left mastoid flu
id. There is no suspicious calvarial lesion.



IMPRESSION: 

1. No acute intracranial finding.

2. Bilateral cerebral white matter changes, most commonly due to chronic small vessel disease in luis
ents of this age.

3. Mild age appropriate cerebral volume loss.



Electronically signed by: Judy Patricia MD (10/27/2021 9:20 AM) [id number]